# Patient Record
Sex: FEMALE | Race: WHITE | Employment: FULL TIME | URBAN - METROPOLITAN AREA
[De-identification: names, ages, dates, MRNs, and addresses within clinical notes are randomized per-mention and may not be internally consistent; named-entity substitution may affect disease eponyms.]

---

## 2018-07-18 ENCOUNTER — HOSPITAL ENCOUNTER (EMERGENCY)
Age: 38
Discharge: HOME | End: 2018-07-18
Attending: EMERGENCY MEDICINE
Payer: COMMERCIAL

## 2018-07-18 ENCOUNTER — APPOINTMENT (OUTPATIENT)
Dept: GENERAL RADIOLOGY | Age: 38
End: 2018-07-18
Attending: EMERGENCY MEDICINE
Payer: COMMERCIAL

## 2018-07-18 VITALS
BODY MASS INDEX: 28.79 KG/M2 | DIASTOLIC BLOOD PRESSURE: 84 MMHG | HEART RATE: 80 BPM | WEIGHT: 190 LBS | RESPIRATION RATE: 20 BRPM | HEIGHT: 68 IN | OXYGEN SATURATION: 100 % | SYSTOLIC BLOOD PRESSURE: 160 MMHG | TEMPERATURE: 98.5 F

## 2018-07-18 DIAGNOSIS — S00.83XA CONTUSION OF FACE, INITIAL ENCOUNTER: Primary | ICD-10-CM

## 2018-07-18 DIAGNOSIS — S16.1XXA STRAIN OF NECK MUSCLE, INITIAL ENCOUNTER: ICD-10-CM

## 2018-07-18 PROCEDURE — 99284 EMERGENCY DEPT VISIT MOD MDM: CPT | Performed by: EMERGENCY MEDICINE

## 2018-07-18 PROCEDURE — 70150 X-RAY EXAM OF FACIAL BONES: CPT

## 2018-07-18 PROCEDURE — 74011250637 HC RX REV CODE- 250/637: Performed by: EMERGENCY MEDICINE

## 2018-07-18 RX ORDER — IBUPROFEN 800 MG/1
800 TABLET ORAL
Status: COMPLETED | OUTPATIENT
Start: 2018-07-18 | End: 2018-07-18

## 2018-07-18 RX ORDER — CYCLOBENZAPRINE HCL 10 MG
10 TABLET ORAL
Status: COMPLETED | OUTPATIENT
Start: 2018-07-18 | End: 2018-07-18

## 2018-07-18 RX ORDER — ORPHENADRINE CITRATE 100 MG/1
100 TABLET, EXTENDED RELEASE ORAL 2 TIMES DAILY
Qty: 20 TAB | Refills: 0 | Status: SHIPPED | OUTPATIENT
Start: 2018-07-18

## 2018-07-18 RX ORDER — DICLOFENAC SODIUM 75 MG/1
75 TABLET, DELAYED RELEASE ORAL 2 TIMES DAILY
Qty: 20 TAB | Refills: 0 | Status: SHIPPED | OUTPATIENT
Start: 2018-07-18

## 2018-07-18 RX ADMIN — CYCLOBENZAPRINE HYDROCHLORIDE 10 MG: 10 TABLET, FILM COATED ORAL at 22:06

## 2018-07-18 RX ADMIN — IBUPROFEN 800 MG: 800 TABLET, FILM COATED ORAL at 22:06
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
## 2018-07-19 NOTE — ED NOTES
I have reviewed discharge instructions with the patient and spouse. The patient and spouse verbalized understanding. Patient left ED via Discharge Method: ambulatory to Home with spouse. Opportunity for questions and clarification provided. Patient given 2 scripts. To continue your aftercare when you leave the hospital, you may receive an automated call from our care team to check in on how you are doing. This is a free service and part of our promise to provide the best care and service to meet your aftercare needs.  If you have questions, or wish to unsubscribe from this service please call [phone]. Thank you for Choosing our [facility].

## 2018-07-19 NOTE — DISCHARGE INSTRUCTIONS
Neck Strain: Care Instructions  Your Care Instructions    You have strained the muscles and ligaments in your neck. A sudden, awkward movement can strain the neck. This often occurs with falls or car accidents or during certain sports. Everyday activities like working on a computer or sleeping can also cause neck strain if they force you to hold your neck in an awkward position for a long time. It is common for neck pain to get worse for a day or two after an injury, but it should start to feel better after that. You may have more pain and stiffness for several days before it gets better. This is expected. It may take a few weeks or longer for it to heal completely. Good home treatment can help you get better faster and avoid future neck problems. Follow-up care is a key part of your treatment and safety. Be sure to make and go to all appointments, and call your doctor if you are having problems. It's also a good idea to know your test results and keep a list of the medicines you take. How can you care for yourself at home? · If you were given a neck brace (cervical collar) to limit neck motion, wear it as instructed for as many days as your doctor tells you to. Do not wear it longer than you were told to. Wearing a brace for too long can make neck stiffness worse and weaken the neck muscles. · You can try using heat or ice to see if it helps. ¨ Try using a heating pad on a low or medium setting for 15 to 20 minutes every 2 to 3 hours. Try a warm shower in place of one session with the heating pad. You can also buy single-use heat wraps that last up to 8 hours. ¨ You can also try an ice pack for 10 to 15 minutes every 2 to 3 hours. · Take pain medicines exactly as directed. ¨ If the doctor gave you a prescription medicine for pain, take it as prescribed. ¨ If you are not taking a prescription pain medicine, ask your doctor if you can take an over-the-counter medicine.   · Gently rub the area to relieve pain and help with blood flow. Do not massage the area if it hurts to do so. · Do not do anything that makes the pain worse. Take it easy for a couple of days. You can do your usual activities if they do not hurt your neck or put it at risk for more stress or injury. · Try sleeping on a special neck pillow. Place it under your neck, not under your head. Placing a tightly rolled-up towel under your neck while you sleep will also work. If you use a neck pillow or rolled towel, do not use your regular pillow at the same time. · To prevent future neck pain, do exercises to stretch and strengthen your neck and back. Learn how to use good posture, safe lifting techniques, and proper body mechanics. When should you call for help? Call 911 anytime you think you may need emergency care. For example, call if:    · You are unable to move an arm or a leg at all.   [facility] your doctor now or seek immediate medical care if:    · You have new or worse symptoms in your arms, legs, chest, belly, or buttocks. Symptoms may include:  ¨ Numbness or tingling. ¨ Weakness. ¨ Pain.     · You lose bladder or bowel control.    Watch closely for changes in your health, and be sure to contact your doctor if:    · You are not getting better as expected. Where can you learn more? Go to http://rancho-kenn.info/. Enter M253 in the search box to learn more about \"Neck Strain: Care Instructions. \"  Current as of: November 29, 2017  Content Version: 11.7  © 6250-3751 [employer]. Care instructions adapted under license by [employer] (which disclaims liability or warranty for this information). If you have questions about a medical condition or this instruction, always ask your healthcare professional. [address] any warranty or liability for your use of this information.

## 2024-05-28 ENCOUNTER — OFFICE VISIT (OUTPATIENT)
Age: 44
End: 2024-05-28
Payer: COMMERCIAL

## 2024-05-28 VITALS — BODY MASS INDEX: 32.58 KG/M2 | WEIGHT: 215 LBS | HEIGHT: 68 IN

## 2024-05-28 DIAGNOSIS — M47.816 FACET ARTHROPATHY, LUMBAR: ICD-10-CM

## 2024-05-28 DIAGNOSIS — M51.16 LUMBAR DISC HERNIATION WITH RADICULOPATHY: ICD-10-CM

## 2024-05-28 DIAGNOSIS — M47.816 LUMBAR SPONDYLOSIS: Primary | ICD-10-CM

## 2024-05-28 PROCEDURE — 99204 OFFICE O/P NEW MOD 45 MIN: CPT | Performed by: PHYSICIAN ASSISTANT

## 2024-05-28 RX ORDER — DICLOFENAC SODIUM 75 MG/1
75 TABLET, DELAYED RELEASE ORAL 2 TIMES DAILY PRN
Qty: 60 TABLET | Refills: 0 | Status: SHIPPED | OUTPATIENT
Start: 2024-05-28

## 2024-05-28 NOTE — PROGRESS NOTES
Name: Lulu Hoover  YOB: 1980  Gender: female  MRN: [id number]    CC: New Patient (Low back pain )       HPI: This is a 43 y.o. year old female who reports many year history of low back pain.  In 2021 she had radiofrequency ablation lumbar spine but Sakshi spine [place] from L3-L5 bilaterally.  This did help with her lower back pain for short period of time.  The recent years the pain has returned and she had a severe exacerbation this past Sunday.  Pain is across the back it can radiate into the buttock and occasionally she will get some numbness on the left anterior thigh.  Symptoms are worse when she standing and walking.  Recently she was only able to stand and walk 12 minutes before she had to sit down.  In the past she had physical therapy, chiropractic care, massage therapy and nothing has alleviated her symptoms.  She had an MRI scan in 2016 that revealed facet arthropathy anterolisthesis note of a disc bulge at L4-5 with some slight abutment on the left L4 nerve root.  I do not have these images but the report is in her records.      This patient  has not had lumbar surgery in the past.          ROS/Meds/PSH/PMH/FH/SH: I personally reviewed the patient's collected intake data.  Below are the pertinents:    Allergies   Allergen Reactions    Sulfa Antibiotics Other (See Comments) and Rash     Other reaction(s): Other (comments)         Current Outpatient Medications:     diclofenac (VOLTAREN) 75 MG EC tablet, Take 1 tablet by mouth 2 times daily as needed for Pain, Disp: 60 tablet, Rfl: 0    No past surgical history on file.    Patient Active Problem List   Diagnosis    Kidney stone         Tobacco:  reports that she has never smoked. She has never used smokeless tobacco.  Alcohol:   Social History     Substance and Sexual Activity   Alcohol Use Not on file        Physical Exam:   BMI: Body mass index is 32.69 kg/m².    GENERAL:  Adult in no acute distress, moderately obese Patient is

## 2024-06-05 ENCOUNTER — HOSPITAL ENCOUNTER (OUTPATIENT)
Dept: PHYSICAL THERAPY | Age: 44
Setting detail: RECURRING SERIES
Discharge: HOME | End: 2024-06-08
Payer: COMMERCIAL

## 2024-06-05 DIAGNOSIS — M62.81 MUSCLE WEAKNESS (GENERALIZED): ICD-10-CM

## 2024-06-05 DIAGNOSIS — G89.29 CHRONIC BACK PAIN GREATER THAN 3 MONTHS DURATION: Primary | ICD-10-CM

## 2024-06-05 DIAGNOSIS — M54.9 CHRONIC BACK PAIN GREATER THAN 3 MONTHS DURATION: Primary | ICD-10-CM

## 2024-06-05 PROCEDURE — 97162 PT EVAL MOD COMPLEX 30 MIN: CPT

## 2024-06-05 NOTE — THERAPY EVALUATION
Strength, Decreased Functional Mobility, Decreased Frio with Home Exercise Program, Decreased Body Mechanics, Difficulty Sleeping, Decreased Activity Tolerance/Endurance*, Decreased Pacing Skills, Increased Fatigue, and Decreased ADL Status   Therapy Prognosis:   Good     Initial Assessment Complexity:   Moderate Complexity       PLAN   Effective Dates: 6/5/2024 TO Plan of Care/Certification Expiration Date: 08/05/24     Frequency/Duration:      Interventions Planned (Treatment may consist of any combination of the following):    Endurance Training, Functional Mobility Training, Home Exercise Program (HEP), Therapeutic Activites, Therapeutic Exercise/Strengthening, and Patient/Caregiver Education & Training   Goals: (Goals have been discussed and agreed upon with patient.)  GOALS: (Goals have been discussed and agreed upon with patient.)   Long term Goals: 8 weeks  Goal Met   1. Lulu Hoover will be independent with HEP to maintain functional gains made with therapy intervention. 1.  [] Date:   2. Lulu Hoover will be able to stand x 30 min in order to prep meals at home. 2.  [] Date:   3. Lulu Hoover will participate in walking program x 6 minutes at a distance of 1000  ft to be comparable to age related norms. 3.  [] Date:   4. Lulu Hoover will be able to sit for 60 min in order to drive in car and complete work related responsibilities. 4.  [] Date:   5. Lulu Hoover will demonstrate a 10 point improvement on the Oswestry to show improvement in function and participation in ADLs/IADLs 5.  [] Date:   6. Lulu Hoover will be able to pull/push 50 lbs in order to complete household chores without restriction. 6.  [] Date:   7. Lulu Hoover will demonstrate appropriate lifting technique from floor to waist level with 20 lbs and no cues from therapist to complete  7.  [] Date:   8. Lulu Hoover will be able to carry 10 lbs in bilateral UE in order to complete household chores, community

## 2024-06-07 ENCOUNTER — HOSPITAL ENCOUNTER (OUTPATIENT)
Dept: PHYSICAL THERAPY | Age: 44
Setting detail: RECURRING SERIES
Discharge: HOME | End: 2024-06-10
Payer: COMMERCIAL

## 2024-06-07 PROCEDURE — 97110 THERAPEUTIC EXERCISES: CPT

## 2024-06-07 NOTE — PROGRESS NOTES
Lulu Hoover  : 1980  Primary: [employer]raw[employer] Kristine ([employer]mercial)  Secondary:  [facility] @ [place] [address] [place] [address]  Phone: [phone]  Fax: [phone]    Plan of Care/Certification Expiration Date: 24        Plan of Care/Certification Expiration Date:  Plan of Care/Certification Expiration Date: 24    Frequency/Duration:      Time In/Out:   Time In: 0820  Time Out: 0900      PT Visit Info:         Visit Count:  2    OUTPATIENT PHYSICAL THERAPY:   Treatment Note 2024       Episode  (chronic back pain)               Treatment Diagnosis:    Chronic back pain greater than 3 months duration  Muscle weakness (generalized)  Medical/Referring Diagnosis:    Lumbar spondylosis  Facet arthropathy, lumbar  Lumbar disc herniation with radiculopathy      Referring Physician:  Debo Kraft PA-C MD Orders:  PT Eval and Treat   Return MD Appt:  24   Date of Onset:  chronic  Allergies:   Sulfa antibiotics  Restrictions/Precautions:   None      Interventions Planned (Treatment may consist of any combination of the following):     See Assessment Note    Subjective Comments:   I really was blown away with the realization that my pain is affected by many different things in my life, not just the arthritis.   Initial Pain Level::     did not rate /10  Post Session Pain Level:        /10  Medications Last Reviewed:  2024  Updated Objective Findings:  see initial evaluation  Treatment     THERAPEUTIC EXERCISE: (40 minutes):    Exercises per grid below to improve mobility, strength, balance, and coordination.   Progressed resistance and repetitions as indicated.     Date:  2024 Date:   Date:     Activity/Exercise Parameters Parameters Parameters   Edu Pain science basics: alarm system sensitivity, cup analogy. Value of cyclic sighs for decreasing anxiety and pain response       Cyclic sighs Explanation and practice

## 2024-06-11 ENCOUNTER — HOSPITAL ENCOUNTER (OUTPATIENT)
Dept: PHYSICAL THERAPY | Age: 44
Setting detail: RECURRING SERIES
Discharge: HOME | End: 2024-06-14
Payer: COMMERCIAL

## 2024-06-11 PROCEDURE — 97110 THERAPEUTIC EXERCISES: CPT

## 2024-06-11 NOTE — PROGRESS NOTES
Lulu Hoover  : 1980  Primary: [employer] Kristine ([employer])  Secondary:  [facility] @ [place] [address] [place] [address]  Phone: [phone]  Fax: [phone]    Plan of Care/Certification Expiration Date: 24        Plan of Care/Certification Expiration Date:  Plan of Care/Certification Expiration Date: 24    Frequency/Duration:  2 x week    Time In/Out:          PT Visit Info:         Visit Count:  3    OUTPATIENT PHYSICAL THERAPY:   Treatment Note 2024       Episode  (chronic back pain)               Treatment Diagnosis:    Chronic back pain greater than 3 months duration  Muscle weakness (generalized)  Medical/Referring Diagnosis:          Referring Physician:  Debo Kraft PA-C MD Orders:  PT Eval and Treat   Return MD Appt:  24   Date of Onset:  chronic  Allergies:   Sulfa antibiotics  Restrictions/Precautions:   None      Interventions Planned (Treatment may consist of any combination of the following):     See Assessment Note    Subjective Comments:   Walked 10,000 steps yesterday, back did not hurt just feet. Has watched 3 of Kirby Zaragoza's recovery strategies videos on [employer].  Initial Pain Level::     did not rate /10  Post Session Pain Level:        /10  Medications Last Reviewed:  2024  Updated Objective Findings:  20 sit to stands in 30 seconds, lumbar flexion full range of motion.  Treatment     THERAPEUTIC EXERCISE: (40 minutes):    Exercises per grid below to improve mobility, strength, balance, and coordination.   Progressed resistance and repetitions as indicated.     Date:  2024 Date:  24 Date:     Activity/Exercise Parameters Parameters Parameters   Edu Pain science basics: alarm system sensitivity, cup analogy. Value of cyclic sighs for decreasing anxiety and pain response   Motion is lotion, benefits of strength training. Safe pain parameters traffic light system.    Cyclic sighs Explanation and practice

## 2024-06-13 ENCOUNTER — HOSPITAL ENCOUNTER (OUTPATIENT)
Dept: PHYSICAL THERAPY | Age: 44
Setting detail: RECURRING SERIES
Discharge: HOME | End: 2024-06-16
Payer: COMMERCIAL

## 2024-06-13 PROCEDURE — 97110 THERAPEUTIC EXERCISES: CPT

## 2024-06-13 PROCEDURE — 97530 THERAPEUTIC ACTIVITIES: CPT

## 2024-06-13 NOTE — PROGRESS NOTES
Lulu Kiko  : 1980  Primary: Nithya Kristine (Commercial)  Secondary:  [facility] @ [place] [address] [place] [address]  Phone: [phone]  Fax: [phone]    Plan of Care/Certification Expiration Date: 24        Plan of Care/Certification Expiration Date:  Plan of Care/Certification Expiration Date: 24    Frequency/Duration:  2 x week    Time In/Out:   Time In: 0800  Time Out: 0845      PT Visit Info:         Visit Count:  4    OUTPATIENT PHYSICAL THERAPY:   Treatment Note 2024       Episode  (chronic back pain)               Treatment Diagnosis:    Chronic back pain greater than 3 months duration  Muscle weakness (generalized)  Medical/Referring Diagnosis:          Referring Physician:  Debo Kraft PA-C MD Orders:  PT Eval and Treat   Return MD Appt:  24   Date of Onset:  chronic  Allergies:   Sulfa antibiotics  Restrictions/Precautions:   None      Interventions Planned (Treatment may consist of any combination of the following):     See Assessment Note    Subjective Comments:   Shins are a little sore from walking alot  Initial Pain Level::   3 /10  Post Session Pain Level:       3 /10  Medications Last Reviewed:  2024  Updated Objective Findings:  see flowsheet  Treatment     THERAPEUTIC EXERCISE: (25 minutes):    Exercises per grid below to improve mobility, strength, balance, and coordination.   Progressed resistance and repetitions as indicated.     Date:  2024 Date:  24 Date:  24   Activity/Exercise Parameters Parameters Parameters   Edu Pain science basics: alarm system sensitivity, cup analogy. Value of cyclic sighs for decreasing anxiety and pain response   Motion is lotion, benefits of strength training. Safe pain parameters traffic light system. Poking into pain    Cyclic sighs Explanation and practice Review  review   Treadmill   1.7 mph 5 min   HR 85 Intervals top speed 3.3   10 min     L stretch

## 2024-06-18 ENCOUNTER — HOSPITAL ENCOUNTER (OUTPATIENT)
Dept: PHYSICAL THERAPY | Age: 44
Setting detail: RECURRING SERIES
Discharge: HOME | End: 2024-06-21
Payer: COMMERCIAL

## 2024-06-18 ENCOUNTER — OFFICE VISIT (OUTPATIENT)
Age: 44
End: 2024-06-18
Payer: COMMERCIAL

## 2024-06-18 DIAGNOSIS — M65.311 TRIGGER THUMB OF RIGHT HAND: ICD-10-CM

## 2024-06-18 DIAGNOSIS — M65.341 TRIGGER RING FINGER OF RIGHT HAND: Primary | ICD-10-CM

## 2024-06-18 PROCEDURE — 97110 THERAPEUTIC EXERCISES: CPT

## 2024-06-18 PROCEDURE — 99214 OFFICE O/P EST MOD 30 MIN: CPT | Performed by: NURSE PRACTITIONER

## 2024-06-18 PROCEDURE — 97530 THERAPEUTIC ACTIVITIES: CPT

## 2024-06-18 NOTE — PROGRESS NOTES
Orthopaedic Hand Surgery Note    Name: Lulu Hoover  YOB: 1980  Gender: female  MRN: [id number]    CC: New patient referred for right ring and right thumb pain    HPI: Patient is a 43 y.o. female with a chief complaint of right ring finger clicking, locking and pain. The symptoms have been going on for 4 years.  She notes that her finger is locking and extremely painful to unlock.  She notes her thumb has just started.  She denies injury.  She denies diabetes.  She has put off coming to the doctor because she has been very nervous about treatment for this..     ROS/Meds/PSH/PMH/FH/SH: I personally reviewed the patients standard intake form.  Pertinents are discussed in the HPI    Physical Examination:  Musculoskeletal:   Examination on the right demonstrates Normal sensation to light touch in the median distribution, normal sensation in ulnar and radial distribution, positive carpal tunnel compression testing and Phalen testing.  Positive tenderness of the right ring and right thumb A1 pulley with palpable clicking and positive  locking to the ring finger and negative locking to the thumb. The extensor tendons all track well over the MCP joints.    Imaging / Electrodiagnostic Tests:     None    Assessment:     ICD-10-CM    1. Trigger ring finger of right hand  M65.341       2. Trigger thumb of right hand  M65.311           Plan:  We discussed the diagnosis and different treatment options. We discussed observation, splinting, cortisone injections and surgical release of the A1 pulley. We discussed that stenosing tenosynovitis at the level of the A1 pulley AKA trigger finger is a chronic condition regardless of how long the symptoms have been present, this most likely has been progressing for much longer than the symptoms were evident and it will likely persist for a long time without medical treatment. Furthermore, the many patients require surgical trigger finger release at some point despite

## 2024-06-18 NOTE — PROGRESS NOTES
Lulu Hoover  : 1980  Primary: [employer] Kristine ([employer])  Secondary:  [facility] @ [place] [address] [place] [address]  Phone: [phone]  Fax: [phone]    Plan of Care/Certification Expiration Date: 24        Plan of Care/Certification Expiration Date:  Plan of Care/Certification Expiration Date: 24    Frequency/Duration:  2 x week    Time In/Out:   Time In: 0800  Time Out: 0845      PT Visit Info:         Visit Count:  5    OUTPATIENT PHYSICAL THERAPY:   Treatment Note 2024       Episode  (chronic back pain)               Treatment Diagnosis:    Chronic back pain greater than 3 months duration  Muscle weakness (generalized)  Medical/Referring Diagnosis:          Referring Physician:  Debo Kraft PA-C MD Orders:  PT Eval and Treat   Return MD Appt:  24   Date of Onset:  chronic  Allergies:   Sulfa antibiotics  Restrictions/Precautions:   None      Interventions Planned (Treatment may consist of any combination of the following):     See Assessment Note    Subjective Comments:   Shins are  sore from walking alot  Initial Pain Level::   3 10  Post Session Pain Level:       3 /10  Medications Last Reviewed:  2024  Updated Objective Findings:  pain increased left shin with walking for 5 minutes on treadmill  Treatment     THERAPEUTIC EXERCISE: (25 minutes):    Exercises per grid below to improve mobility, strength, balance, and coordination.   Progressed resistance and repetitions as indicated.     Date:  2024 Date:  24 Date:  24 Date  24   Activity/Exercise Parameters Parameters Parameters    Edu Pain science basics: alarm system sensitivity, cup analogy. Value of cyclic sighs for decreasing anxiety and pain response   Motion is lotion, benefits of strength training. Safe pain parameters traffic light system. Poking into pain  Too much too soon importance of rest days and gradual increase in activity   Cyclic

## 2024-06-18 NOTE — H&P (VIEW-ONLY)
some short-term benefits of conservative treatment.  After discussing in detail the patient elects to proceed with surgical intervention.  Risk and benefits were addressed in detail with the patient.  She understands and wishes to proceed.  We discussed her postoperative recovery including her restrictions.  This has been going on for over 4 years and is locking every time she makes a fist.  We will give her figure-of-eight brace to use until surgery.    Patient understands risks and benefits of ULTRASOUND-GUIDED RIGHT RING TRIGGER FINGER RELEASE AND RIGHT TRIGGER THUMB INJECTION including but not limited to nerve injury, vessel injury, infection, failure to achieve desired results and possible need for additional surgery. Patient understands and wishes to proceed with surgery.     On Exam:   The patient is alert and oriented; ;   Lung auscultation is clear bilaterally   Heart has RRR without murmurs     Patient voiced accordance and understanding of the information provided and the formulated plan. All questions were answered to the patient's satisfaction during the encounter.    4 This is a chronic illness with exacerbation, progression, or side effect of treatment  Treatment at this time:  Brace, surgical intervention.    SHIRLEY Pruitt - CNP  Orthopaedic Surgery  06/18/24  1:25 PM

## 2024-06-20 DIAGNOSIS — M65.311 TRIGGER THUMB OF RIGHT HAND: ICD-10-CM

## 2024-06-20 DIAGNOSIS — M65.311 TRIGGER THUMB, RIGHT THUMB: ICD-10-CM

## 2024-06-20 DIAGNOSIS — M65.341 TRIGGER RING FINGER OF RIGHT HAND: Primary | ICD-10-CM

## 2024-06-20 DIAGNOSIS — M65.341 TRIGGER FINGER, RIGHT RING FINGER: ICD-10-CM

## 2024-06-21 ENCOUNTER — HOSPITAL ENCOUNTER (OUTPATIENT)
Dept: PHYSICAL THERAPY | Age: 44
Setting detail: RECURRING SERIES
End: 2024-06-21
Payer: COMMERCIAL

## 2024-06-25 ENCOUNTER — HOSPITAL ENCOUNTER (OUTPATIENT)
Dept: PHYSICAL THERAPY | Age: 44
Setting detail: RECURRING SERIES
Discharge: HOME | End: 2024-06-28
Payer: COMMERCIAL

## 2024-06-25 PROCEDURE — 97110 THERAPEUTIC EXERCISES: CPT

## 2024-06-25 RX ORDER — DICLOFENAC SODIUM 75 MG/1
75 TABLET, DELAYED RELEASE ORAL 2 TIMES DAILY PRN
Qty: 60 TABLET | Refills: 0 | OUTPATIENT
Start: 2024-06-25

## 2024-06-25 NOTE — PROGRESS NOTES
Lulu Kiko  : 1980  Primary: [employer] ([employer])  Secondary:  [facility] @ [place] [address] [address] [place] [address]  Phone: [phone]  Fax: [phone]    Plan of Care/Certification Expiration Date: 24        Plan of Care/Certification Expiration Date:  Plan of Care/Certification Expiration Date: 24    Frequency/Duration:  2 x week    Time In/Out:   Time In: 0715  Time Out: 0800      PT Visit Info:         Visit Count:  6    OUTPATIENT PHYSICAL THERAPY:   Treatment Note 2024       Episode  (chronic back pain)               Treatment Diagnosis:    Chronic back pain greater than 3 months duration  Muscle weakness (generalized)  Medical/Referring Diagnosis:    Lumbar spondylosis  Facet arthropathy, lumbar  Lumbar disc herniation with radiculopathy      Referring Physician:  Debo Kraft PA-C MD Orders:  PT Eval and Treat   Return MD Appt:  24   Date of Onset:  chronic  Allergies:   Sulfa antibiotics  Restrictions/Precautions:   None      Interventions Planned (Treatment may consist of any combination of the following):     See Assessment Note    Subjective Comments:   Has started using her rower machine at home. Watched the instructional videos on [employer] ([employer]), that were suggested and they were very helpful.  Initial Pain Level::  did not rate /10  Post Session Pain Level:       3 /10  Medications Last Reviewed:  2024  Updated Objective Findings: squat to 17\" bench  Treatment     THERAPEUTIC EXERCISE: (40 minutes):    Exercises per grid below to improve mobility, strength, balance, and coordination.   Progressed resistance and repetitions as indicated.     Date:  2024 Date:  24 Date:  24 Date  24 Date  24   Activity/Exercise Parameters Parameters Parameters     Edu Pain science basics: alarm system sensitivity, cup analogy. Value of cyclic sighs for decreasing anxiety and pain response   Motion

## 2024-06-27 ENCOUNTER — HOSPITAL ENCOUNTER (OUTPATIENT)
Dept: PHYSICAL THERAPY | Age: 44
Setting detail: RECURRING SERIES
Discharge: HOME | End: 2024-06-30
Payer: COMMERCIAL

## 2024-06-27 PROCEDURE — 97110 THERAPEUTIC EXERCISES: CPT

## 2024-06-27 PROCEDURE — 97530 THERAPEUTIC ACTIVITIES: CPT

## 2024-06-27 NOTE — PROGRESS NOTES
Lulu Hoover  : 1980  Primary: [employer] ([employer])  Secondary:  [facility] @ [place] [address] [address] [place] [address]  Phone: [phone]  Fax: [phone]    Plan of Care/Certification Expiration Date: 24        Plan of Care/Certification Expiration Date:  Plan of Care/Certification Expiration Date: 24    Frequency/Duration:  2 x week    Time In/Out:   Time In: 0800  Time Out: 0855      PT Visit Info:         Visit Count:  7    OUTPATIENT PHYSICAL THERAPY:   Treatment Note 2024       Episode  (chronic back pain)               Treatment Diagnosis:    Chronic back pain greater than 3 months duration  Muscle weakness (generalized)  Medical/Referring Diagnosis:    Lumbar spondylosis  Facet arthropathy, lumbar  Lumbar disc herniation with radiculopathy      Referring Physician:  Debo Kraft PA-C MD Orders:  PT Eval and Treat   Return MD Appt:  24   Date of Onset:  chronic  Allergies:   Sulfa antibiotics  Restrictions/Precautions:   None      Interventions Planned (Treatment may consist of any combination of the following):     See Assessment Note    Subjective Comments:   [place]ent to zoo yesterday did not have to sit down to rest.  Initial Pain Level::  did not rate /10  Post Session Pain Level:       3 /10  Medications Last Reviewed:  2024  Updated Objective Findings: can bend forward and pick 15 lbs up from the floor without pain complaints.  Treatment     THERAPEUTIC EXERCISE: (40 minutes):    Exercises per grid below to improve mobility, strength, balance, and coordination.   Progressed resistance and repetitions as indicated.     Date:  2024 Date:  24 Date:  24 Date  24 Date  24 Date  24   Activity/Exercise Parameters Parameters Parameters      Edu Pain science basics: alarm system sensitivity, cup analogy. Value of cyclic sighs for decreasing anxiety and pain response   Motion is lotion, benefits of

## 2024-07-01 NOTE — PERIOP NOTE
Patient verified name and .  Order for consent NOT found in EHR at time of PAT visit. Unable to verify case posting against order; surgery verified by patient.     Type 1a surgery, PAT phone assessment complete.  Orders not received.  Labs per surgeon: unknown; no orders received    Labs per anesthesia protocol: none indicated    Patient answered medical/surgical history questions at their best of ability. All prior to admission medications documented in EPIC.    Patient instructed to continue taking all prescription medications up to the day of surgery but to take only the following medications the day of surgery according to anesthesia guidelines with a small sip of water: none      Patient informed that all vitamins and supplements should be held 7 days prior to surgery and NSAIDS 5 days prior to surgery. Prescription meds to hold:diclofenac 5 days    Patient instructed on the following:    > Arrive at OPC Entrance, time of arrival to be called the day before by 1700  > NPO after midnight, unless otherwise indicated, including gum, mints, and ice chips  > Responsible adult must drive patient to the hospital, stay during surgery, and patient will need supervision 24 hours after anesthesia  > Use non moisturizing soap in shower the night before surgery and on the morning of surgery  > All piercings must be removed prior to arrival.    > Leave all valuables (money and jewelry) at home but bring insurance card and ID on DOS.   > You may be required to pay a deductible or co-pay on the day of your procedure. You can pre-pay by calling [phone] if your surgery is at the [facility] or [phone] if your surgery is at the [facility].  > Do not wear make-up, nail polish, lotions, cologne, perfumes, powders, or oil on skin. Artificial nails are not permitted.

## 2024-07-05 NOTE — PERIOP NOTE
Preop department called to notify patient of arrival time for scheduled procedure. Instructions given to   - Arrive at OPC Entrance [address].  - Remain NPO after midnight, unless otherwise indicated, including gum, mints, and ice chips.   - Have a responsible adult to drive patient to the hospital, stay during surgery, and patient will need supervision 24 hours after anesthesia.   - Use antibacterial soap in shower the night before surgery and on the morning of surgery.       Was patient contacted: yes  Voicemail left:   Numbers contacted: [phone]   Arrival time: 0700

## 2024-07-07 ENCOUNTER — ANESTHESIA EVENT (OUTPATIENT)
Dept: SURGERY | Age: 44
End: 2024-07-07
Payer: COMMERCIAL

## 2024-07-07 DIAGNOSIS — M65.341 TRIGGER RING FINGER OF RIGHT HAND: Primary | ICD-10-CM

## 2024-07-07 DIAGNOSIS — M65.311 TRIGGER THUMB OF RIGHT HAND: ICD-10-CM

## 2024-07-08 ENCOUNTER — ANESTHESIA (OUTPATIENT)
Dept: SURGERY | Age: 44
End: 2024-07-08
Payer: COMMERCIAL

## 2024-07-08 ENCOUNTER — HOSPITAL ENCOUNTER (OUTPATIENT)
Age: 44
Setting detail: OUTPATIENT SURGERY
Discharge: HOME | End: 2024-07-08
Attending: ORTHOPAEDIC SURGERY | Admitting: ORTHOPAEDIC SURGERY
Payer: COMMERCIAL

## 2024-07-08 VITALS
TEMPERATURE: 97.1 F | SYSTOLIC BLOOD PRESSURE: 123 MMHG | RESPIRATION RATE: 15 BRPM | HEIGHT: 68 IN | BODY MASS INDEX: 37.13 KG/M2 | HEART RATE: 83 BPM | DIASTOLIC BLOOD PRESSURE: 76 MMHG | OXYGEN SATURATION: 97 % | WEIGHT: 245 LBS

## 2024-07-08 LAB
ALBUMIN SERPL-MCNC: 3.9 G/DL (ref 3.5–5)
ALBUMIN/GLOB SERPL: 1.3 (ref 1–1.9)
ALP SERPL-CCNC: 62 U/L (ref 35–104)
ALT SERPL-CCNC: 27 U/L (ref 12–65)
ANION GAP SERPL CALC-SCNC: 11 MMOL/L (ref 9–18)
AST SERPL-CCNC: 25 U/L (ref 15–37)
BASOPHILS # BLD: 0.1 K/UL (ref 0–0.2)
BASOPHILS NFR BLD: 1 % (ref 0–2)
BILIRUB SERPL-MCNC: 0.3 MG/DL (ref 0–1.2)
BUN SERPL-MCNC: 13 MG/DL (ref 6–23)
CALCIUM SERPL-MCNC: 8.9 MG/DL (ref 8.8–10.2)
CHLORIDE SERPL-SCNC: 106 MMOL/L (ref 98–107)
CO2 SERPL-SCNC: 24 MMOL/L (ref 20–28)
CREAT SERPL-MCNC: 0.79 MG/DL (ref 0.6–1.1)
DIFFERENTIAL METHOD BLD: NORMAL
EOSINOPHIL # BLD: 0.2 K/UL (ref 0–0.8)
EOSINOPHIL NFR BLD: 3 % (ref 0.5–7.8)
ERYTHROCYTE [DISTWIDTH] IN BLOOD BY AUTOMATED COUNT: 13.1 % (ref 11.9–14.6)
ERYTHROCYTE [SEDIMENTATION RATE] IN BLOOD: 5 MM/HR (ref 0–20)
GLOBULIN SER CALC-MCNC: 2.9 G/DL (ref 2.3–3.5)
GLUCOSE SERPL-MCNC: 93 MG/DL (ref 70–99)
HCT VFR BLD AUTO: 39.6 % (ref 35.8–46.3)
HGB BLD-MCNC: 13.2 G/DL (ref 11.7–15.4)
IMM GRANULOCYTES # BLD AUTO: 0 K/UL (ref 0–0.5)
IMM GRANULOCYTES NFR BLD AUTO: 0 % (ref 0–5)
LYMPHOCYTES # BLD: 1.7 K/UL (ref 0.5–4.6)
LYMPHOCYTES NFR BLD: 24 % (ref 13–44)
MCH RBC QN AUTO: 30.1 PG (ref 26.1–32.9)
MCHC RBC AUTO-ENTMCNC: 33.3 G/DL (ref 31.4–35)
MCV RBC AUTO: 90.2 FL (ref 82–102)
MONOCYTES # BLD: 0.6 K/UL (ref 0.1–1.3)
MONOCYTES NFR BLD: 9 % (ref 4–12)
NEUTS SEG # BLD: 4.5 K/UL (ref 1.7–8.2)
NEUTS SEG NFR BLD: 63 % (ref 43–78)
NRBC # BLD: 0 K/UL (ref 0–0.2)
PLATELET # BLD AUTO: 274 K/UL (ref 150–450)
PMV BLD AUTO: 9.9 FL (ref 9.4–12.3)
POTASSIUM SERPL-SCNC: 4.2 MMOL/L (ref 3.5–5.1)
PROT SERPL-MCNC: 6.8 G/DL (ref 6.3–8.2)
RBC # BLD AUTO: 4.39 M/UL (ref 4.05–5.2)
SODIUM SERPL-SCNC: 141 MMOL/L (ref 136–145)
URATE SERPL-MCNC: 7.6 MG/DL (ref 2.5–7.1)
WBC # BLD AUTO: 7.2 K/UL (ref 4.3–11.1)

## 2024-07-08 PROCEDURE — 3600000012 HC SURGERY LEVEL 2 ADDTL 15MIN: Performed by: ORTHOPAEDIC SURGERY

## 2024-07-08 PROCEDURE — 6360000002 HC RX W HCPCS: Performed by: NURSE ANESTHETIST, CERTIFIED REGISTERED

## 2024-07-08 PROCEDURE — 86430 RHEUMATOID FACTOR TEST QUAL: CPT

## 2024-07-08 PROCEDURE — 3700000001 HC ADD 15 MINUTES (ANESTHESIA): Performed by: ORTHOPAEDIC SURGERY

## 2024-07-08 PROCEDURE — 2580000003 HC RX 258: Performed by: ANESTHESIOLOGY

## 2024-07-08 PROCEDURE — 3600000002 HC SURGERY LEVEL 2 BASE: Performed by: ORTHOPAEDIC SURGERY

## 2024-07-08 PROCEDURE — 6360000002 HC RX W HCPCS: Performed by: ORTHOPAEDIC SURGERY

## 2024-07-08 PROCEDURE — 86140 C-REACTIVE PROTEIN: CPT

## 2024-07-08 PROCEDURE — 2500000003 HC RX 250 WO HCPCS: Performed by: ORTHOPAEDIC SURGERY

## 2024-07-08 PROCEDURE — 86200 CCP ANTIBODY: CPT

## 2024-07-08 PROCEDURE — 85025 COMPLETE CBC W/AUTO DIFF WBC: CPT

## 2024-07-08 PROCEDURE — 3700000000 HC ANESTHESIA ATTENDED CARE: Performed by: ORTHOPAEDIC SURGERY

## 2024-07-08 PROCEDURE — 85652 RBC SED RATE AUTOMATED: CPT

## 2024-07-08 PROCEDURE — 2709999900 HC NON-CHARGEABLE SUPPLY: Performed by: ORTHOPAEDIC SURGERY

## 2024-07-08 PROCEDURE — 80053 COMPREHEN METABOLIC PANEL: CPT

## 2024-07-08 PROCEDURE — 6360000002 HC RX W HCPCS: Performed by: NURSE PRACTITIONER

## 2024-07-08 PROCEDURE — 81374 HLA I TYPING 1 ANTIGEN LR: CPT

## 2024-07-08 PROCEDURE — 6370000000 HC RX 637 (ALT 250 FOR IP): Performed by: ANESTHESIOLOGY

## 2024-07-08 PROCEDURE — 7100000001 HC PACU RECOVERY - ADDTL 15 MIN: Performed by: ORTHOPAEDIC SURGERY

## 2024-07-08 PROCEDURE — 7100000010 HC PHASE II RECOVERY - FIRST 15 MIN: Performed by: ORTHOPAEDIC SURGERY

## 2024-07-08 PROCEDURE — 7100000011 HC PHASE II RECOVERY - ADDTL 15 MIN: Performed by: ORTHOPAEDIC SURGERY

## 2024-07-08 PROCEDURE — 84550 ASSAY OF BLOOD/URIC ACID: CPT

## 2024-07-08 PROCEDURE — 86038 ANTINUCLEAR ANTIBODIES: CPT

## 2024-07-08 PROCEDURE — 7100000000 HC PACU RECOVERY - FIRST 15 MIN: Performed by: ORTHOPAEDIC SURGERY

## 2024-07-08 RX ORDER — ONDANSETRON 2 MG/ML
4 INJECTION INTRAMUSCULAR; INTRAVENOUS
Status: DISCONTINUED | OUTPATIENT
Start: 2024-07-08 | End: 2024-07-08 | Stop reason: HOSPADM

## 2024-07-08 RX ORDER — IBUPROFEN 600 MG/1
1 TABLET ORAL PRN
Status: DISCONTINUED | OUTPATIENT
Start: 2024-07-08 | End: 2024-07-08 | Stop reason: HOSPADM

## 2024-07-08 RX ORDER — METHYLPREDNISOLONE ACETATE 40 MG/ML
INJECTION, SUSPENSION INTRA-ARTICULAR; INTRALESIONAL; INTRAMUSCULAR; SOFT TISSUE PRN
Status: DISCONTINUED | OUTPATIENT
Start: 2024-07-08 | End: 2024-07-08 | Stop reason: ALTCHOICE

## 2024-07-08 RX ORDER — DEXAMETHASONE SODIUM PHOSPHATE 4 MG/ML
INJECTION, SOLUTION INTRA-ARTICULAR; INTRALESIONAL; INTRAMUSCULAR; INTRAVENOUS; SOFT TISSUE PRN
Status: DISCONTINUED | OUTPATIENT
Start: 2024-07-08 | End: 2024-07-08 | Stop reason: SDUPTHER

## 2024-07-08 RX ORDER — MELOXICAM 15 MG/1
15 TABLET ORAL DAILY
Qty: 21 TABLET | Refills: 0 | Status: SHIPPED | OUTPATIENT
Start: 2024-07-08 | End: 2024-07-29

## 2024-07-08 RX ORDER — SODIUM CHLORIDE, SODIUM LACTATE, POTASSIUM CHLORIDE, CALCIUM CHLORIDE 600; 310; 30; 20 MG/100ML; MG/100ML; MG/100ML; MG/100ML
INJECTION, SOLUTION INTRAVENOUS CONTINUOUS
Status: DISCONTINUED | OUTPATIENT
Start: 2024-07-08 | End: 2024-07-08 | Stop reason: HOSPADM

## 2024-07-08 RX ORDER — HALOPERIDOL 5 MG/ML
1 INJECTION INTRAMUSCULAR
Status: DISCONTINUED | OUTPATIENT
Start: 2024-07-08 | End: 2024-07-08 | Stop reason: HOSPADM

## 2024-07-08 RX ORDER — BUPIVACAINE HYDROCHLORIDE 2.5 MG/ML
INJECTION, SOLUTION EPIDURAL; INFILTRATION; INTRACAUDAL PRN
Status: DISCONTINUED | OUTPATIENT
Start: 2024-07-08 | End: 2024-07-08 | Stop reason: ALTCHOICE

## 2024-07-08 RX ORDER — SODIUM CHLORIDE 0.9 % (FLUSH) 0.9 %
5-40 SYRINGE (ML) INJECTION PRN
Status: DISCONTINUED | OUTPATIENT
Start: 2024-07-08 | End: 2024-07-08 | Stop reason: HOSPADM

## 2024-07-08 RX ORDER — SODIUM CHLORIDE 0.9 % (FLUSH) 0.9 %
5-40 SYRINGE (ML) INJECTION EVERY 12 HOURS SCHEDULED
Status: DISCONTINUED | OUTPATIENT
Start: 2024-07-08 | End: 2024-07-08 | Stop reason: HOSPADM

## 2024-07-08 RX ORDER — SODIUM CHLORIDE 9 MG/ML
INJECTION, SOLUTION INTRAVENOUS PRN
Status: DISCONTINUED | OUTPATIENT
Start: 2024-07-08 | End: 2024-07-08 | Stop reason: HOSPADM

## 2024-07-08 RX ORDER — NALOXONE HYDROCHLORIDE 0.4 MG/ML
INJECTION, SOLUTION INTRAMUSCULAR; INTRAVENOUS; SUBCUTANEOUS PRN
Status: DISCONTINUED | OUTPATIENT
Start: 2024-07-08 | End: 2024-07-08 | Stop reason: HOSPADM

## 2024-07-08 RX ORDER — LIDOCAINE HYDROCHLORIDE 10 MG/ML
1 INJECTION, SOLUTION INFILTRATION; PERINEURAL
Status: DISCONTINUED | OUTPATIENT
Start: 2024-07-08 | End: 2024-07-08 | Stop reason: HOSPADM

## 2024-07-08 RX ORDER — ACETAMINOPHEN 500 MG
1000 TABLET ORAL ONCE
Status: COMPLETED | OUTPATIENT
Start: 2024-07-08 | End: 2024-07-08

## 2024-07-08 RX ORDER — MIDAZOLAM HYDROCHLORIDE 2 MG/2ML
2 INJECTION, SOLUTION INTRAMUSCULAR; INTRAVENOUS
Status: DISCONTINUED | OUTPATIENT
Start: 2024-07-08 | End: 2024-07-08 | Stop reason: HOSPADM

## 2024-07-08 RX ORDER — TRAMADOL HYDROCHLORIDE 50 MG/1
50 TABLET ORAL EVERY 6 HOURS PRN
Qty: 20 TABLET | Refills: 0 | Status: SHIPPED | OUTPATIENT
Start: 2024-07-08 | End: 2024-07-13

## 2024-07-08 RX ORDER — OXYCODONE HYDROCHLORIDE 5 MG/1
5 TABLET ORAL
Status: DISCONTINUED | OUTPATIENT
Start: 2024-07-08 | End: 2024-07-08 | Stop reason: HOSPADM

## 2024-07-08 RX ORDER — LIDOCAINE HYDROCHLORIDE 10 MG/ML
INJECTION, SOLUTION INFILTRATION; PERINEURAL PRN
Status: DISCONTINUED | OUTPATIENT
Start: 2024-07-08 | End: 2024-07-08 | Stop reason: ALTCHOICE

## 2024-07-08 RX ORDER — DEXTROSE MONOHYDRATE 100 MG/ML
INJECTION, SOLUTION INTRAVENOUS CONTINUOUS PRN
Status: DISCONTINUED | OUTPATIENT
Start: 2024-07-08 | End: 2024-07-08 | Stop reason: HOSPADM

## 2024-07-08 RX ORDER — ONDANSETRON 2 MG/ML
INJECTION INTRAMUSCULAR; INTRAVENOUS PRN
Status: DISCONTINUED | OUTPATIENT
Start: 2024-07-08 | End: 2024-07-08 | Stop reason: SDUPTHER

## 2024-07-08 RX ORDER — PROPOFOL 10 MG/ML
INJECTION, EMULSION INTRAVENOUS PRN
Status: DISCONTINUED | OUTPATIENT
Start: 2024-07-08 | End: 2024-07-08 | Stop reason: SDUPTHER

## 2024-07-08 RX ORDER — MIDAZOLAM HYDROCHLORIDE 1 MG/ML
INJECTION INTRAMUSCULAR; INTRAVENOUS PRN
Status: DISCONTINUED | OUTPATIENT
Start: 2024-07-08 | End: 2024-07-08 | Stop reason: SDUPTHER

## 2024-07-08 RX ORDER — HYDROMORPHONE HYDROCHLORIDE 2 MG/ML
0.25 INJECTION, SOLUTION INTRAMUSCULAR; INTRAVENOUS; SUBCUTANEOUS EVERY 5 MIN PRN
Status: DISCONTINUED | OUTPATIENT
Start: 2024-07-08 | End: 2024-07-08 | Stop reason: HOSPADM

## 2024-07-08 RX ADMIN — ONDANSETRON 4 MG: 2 INJECTION INTRAMUSCULAR; INTRAVENOUS at 08:52

## 2024-07-08 RX ADMIN — PROPOFOL 40 MG: 10 INJECTION, EMULSION INTRAVENOUS at 08:49

## 2024-07-08 RX ADMIN — DEXAMETHASONE SODIUM PHOSPHATE 6 MG: 4 INJECTION, SOLUTION INTRAMUSCULAR; INTRAVENOUS at 08:58

## 2024-07-08 RX ADMIN — PROPOFOL 20 MG: 10 INJECTION, EMULSION INTRAVENOUS at 08:51

## 2024-07-08 RX ADMIN — ACETAMINOPHEN 1000 MG: 500 TABLET, FILM COATED ORAL at 07:41

## 2024-07-08 RX ADMIN — PROPOFOL 20 MG: 10 INJECTION, EMULSION INTRAVENOUS at 08:50

## 2024-07-08 RX ADMIN — DEXAMETHASONE SODIUM PHOSPHATE 4 MG: 4 INJECTION, SOLUTION INTRAMUSCULAR; INTRAVENOUS at 08:52

## 2024-07-08 RX ADMIN — MIDAZOLAM 2 MG: 1 INJECTION INTRAMUSCULAR; INTRAVENOUS at 08:43

## 2024-07-08 RX ADMIN — SODIUM CHLORIDE, POTASSIUM CHLORIDE, SODIUM LACTATE AND CALCIUM CHLORIDE: 600; 310; 30; 20 INJECTION, SOLUTION INTRAVENOUS at 07:42

## 2024-07-08 RX ADMIN — Medication 2 G: at 08:47

## 2024-07-08 ASSESSMENT — PAIN - FUNCTIONAL ASSESSMENT: PAIN_FUNCTIONAL_ASSESSMENT: 0-10

## 2024-07-08 ASSESSMENT — PAIN SCALES - GENERAL
PAINLEVEL_OUTOF10: 0

## 2024-07-08 NOTE — ANESTHESIA POSTPROCEDURE EVALUATION
Department of Anesthesiology  Postprocedure Note    Patient: Lulu Hoover  MRN: [id number]  YOB: 1980  Date of evaluation: 7/8/2024    Procedure Summary       Date: 07/08/24 Room / Location: [facility] OP OR 06 / [facility]    Anesthesia Start: 0839 Anesthesia Stop: 0906    Procedures:       right ring ultra sound guided trigger release (Right: Hand)      INJECTION MEDICATION right thumb trigger (Right: Hand) Diagnosis:       Trigger finger, right ring finger      Trigger thumb, right thumb      (Trigger finger, right ring finger [M65.341])      (Trigger thumb, right thumb [M65.311])    Surgeons: Alexsander Wilson MD Responsible Provider: Ifeoma Peña MD    Anesthesia Type: TIVA ASA Status: 2            Anesthesia Type: TIVA    Wesley Phase I: Wesley Score: 10    Wesley Phase II: Wesley Score: 10    Anesthesia Post Evaluation    Patient location during evaluation: PACU  Patient participation: complete - patient participated  Level of consciousness: awake and alert  Airway patency: patent  Nausea: well controlled.  Cardiovascular status: acceptable.  Respiratory status: acceptable  Hydration status: stable  Pain management: adequate    No notable events documented.

## 2024-07-08 NOTE — ANESTHESIA PRE PROCEDURE
Patient Active Problem List   Diagnosis Code   • Kidney stone N20.0   • Trigger thumb of right hand M65.311   • Trigger ring finger of right hand M65.341   • Trigger finger, right ring finger M65.341   • Trigger thumb, right thumb M65.311       Past Medical History:        Diagnosis Date   • Trigger finger of right hand     ring and thumb       Past Surgical History:        Procedure Laterality Date   • DILATION AND CURETTAGE OF UTERUS     • OTHER SURGICAL HISTORY      lumbar ablation   • TONSILLECTOMY     • WISDOM TOOTH EXTRACTION         Social History:    Social History     Tobacco Use   • Smoking status: Never   • Smokeless tobacco: Never   Substance Use Topics   • Alcohol use: Not Currently                                Counseling given: Not Answered      Vital Signs (Current):   Vitals:    07/01/24 1201 07/08/24 0715 07/08/24 0743   BP:  134/87    Pulse:  90    Resp:  18    Temp:  98 °F (36.7 °C)    TempSrc:  Oral    SpO2:  96%    Weight: 111.1 kg (245 lb) 111.1 kg (245 lb) 111.1 kg (245 lb)   Height: 1.727 m (5' 8\")  1.727 m (5' 8\")                                              BP Readings from Last 3 Encounters:   07/08/24 134/87       NPO Status: Time of last liquid consumption: 2000                        Time of last solid consumption: 2000                        Date of last liquid consumption: 07/07/24                        Date of last solid food consumption: 07/07/24    BMI:   Wt Readings from Last 3 Encounters:   07/08/24 111.1 kg (245 lb)   05/28/24 97.5 kg (215 lb)     Body mass index is 37.25 kg/m².    CBC: No results found for: \"WBC\", \"RBC\", \"HGB\", \"HCT\", \"MCV\", \"RDW\", \"PLT\"    CMP: No results found for: \"NA\", \"K\", \"CL\", \"CO2\", \"BUN\", \"CREATININE\", \"GFRAA\", \"AGRATIO\", \"LABGLOM\", \"GLUCOSE\", \"GLU\", \"CALCIUM\", \"BILITOT\", \"ALKPHOS\", \"AST\", \"ALT\"    POC Tests: No results for input(s): \"POCGLU\", \"POCNA\", \"POCK\", \"POCCL\", \"POCBUN\", \"POCHEMO\", \"POCHCT\" in the last 72 hours.    Coags: No results found

## 2024-07-08 NOTE — OP NOTE
flexion crease. The UltraGuideTFR introducer was then passed through the incision, and the tip used to dissect through the remaining subcutaneous tissue while protecting the digital nerves and vessels using ultrasound guidance. The introducer was passed into the tendon sheath. The proximal and distal edges of the A1 pulley were identified, and the introducer tip was passed deep and distal to A1. The cutting blade was then advanced along the introducer in a similar manner. Following confirmation of acceptable placement, including the position of the flexor tendons, digital nerves and vessels, and A2 pulley, the blade was engaged, and the A1 pulley as well as the proximal third of A2 were incised along its length. Flexor tendon sheath proximal to A1 was then released, taking care not to injure the digital nerves and vessels. The cutting blade was disengaged and withdrawn from the incision, the pulley and sheath were probed with the introducer to confirm a complete release, and the device was removed. The patient made a fist, and the absence of residual triggering was confirmed.    Wound was irrigated and sterile dressing applied without sutures.      All ultrasound images were stored    The right thumb A1 pulley was injected with 1 cc of 40 mg/mL of Depo-Medrol and 1 cc of quarter percent bupivacaine plain     Disposition: To PACU with no complications and follow up per routine.  Patient is instructed to remove dressings in five days and other precautions include avoidance of heavy and repetitive lifting for 2 weeks, when an appointment for follow up and suture removal will take place.     Alexsander Wilson MD  07/08/24  9:04 AM

## 2024-07-08 NOTE — PERIOP NOTE
Pt signed pregnancy test refusal form and states there is no way possible she could be pregnant. Form placed on chart.

## 2024-07-08 NOTE — DISCHARGE INSTRUCTIONS
Postoperative  Instructions:      Weightbearing or Lifting:  You may resume activities as tolerated, limit activities depending on pain level    Showering  Instructions:  You may allow soapy water to run through the incision during showers but do not scrub. After each shower pat dry. Do  not  soak  your  Incision in still water or bathtub  for  1  week  after  surgery.    If  the  incision  gets  wet otherwise,  pat  dry  and  do  not  scrub  the  incision.  Do  not  apply  cream  or  lotion  to  incision      Pain  Control:  - You  have  been  given  a  prescription  to  be  taken  as  directed  for  post-operative  pain  control.    In  addition,  elevate  the  operative  extremity  above  the  heart  at  all  times  to  prevent  swelling  and  throbbing  pain.   - If you develop constipation while taking narcotic pain medications (Norco, Hydrocodone, Percocet, Oxycodone, Dilaudid, Hydromorphone) take  over-the-counter  Colace,  100mg  by  mouth  twice  a  Day.     - Nausea  is  a  common  side  effect  of  many  pain  medications.  You  will  want  to  eat something  before  taking  your  pain  medicine  to  help  prevent  Nausea.  - If  you  are  taking  a  prescription  pain  medication  that  contains  acetaminophen,  we  recommend  that  you  do  not  take  additional  over  the  counter  acetaminophen  (Tylenol®).      Other  pain  relieving  options:   - Using  a  cold  pack  to  ice  the  affected  area  a  few  times  a  day  (15  to  20  minutes  at  a  time)  can  help  to  relieve  pain,  reduce  swelling  and  bruising.      - Elevation  of  the  affected  area  can  also  help  to  reduce  pain  and  swelling.      Please  contact us through my chart or call  [phone]  with any concern and ask to speak with Dr Wilson team.  Concerning problems include:      -  Excessive  redness  of  the  incisions      -  Drainage  for  more  than  2  Days after surgery or any foul smelling drainage  -  Fever  of

## 2024-07-08 NOTE — INTERVAL H&P NOTE
H&P Update:  Lulu Hoover was seen and examined.  History and physical has been reviewed. The patient has been examined. There have been no significant clinical changes since the completion of the originally dated History and Physical.    Alexsander Wilson MD  Orthopaedic Surgery  07/08/24  7:57 AM

## 2024-07-09 ENCOUNTER — HOSPITAL ENCOUNTER (OUTPATIENT)
Dept: PHYSICAL THERAPY | Age: 44
Setting detail: RECURRING SERIES
Discharge: HOME | End: 2024-07-12
Payer: COMMERCIAL

## 2024-07-09 ENCOUNTER — OFFICE VISIT (OUTPATIENT)
Age: 44
End: 2024-07-09
Payer: COMMERCIAL

## 2024-07-09 DIAGNOSIS — M47.816 FACET ARTHROPATHY, LUMBAR: Primary | ICD-10-CM

## 2024-07-09 LAB
ANA SER QL: NEGATIVE
CCP IGA+IGG SERPL IA-ACNC: 7 UNITS (ref 0–19)
CRP SERPL-MCNC: 3 MG/L (ref 0–10)
RHEUMATOID FACT SER QL LA: NEGATIVE

## 2024-07-09 PROCEDURE — 97530 THERAPEUTIC ACTIVITIES: CPT

## 2024-07-09 PROCEDURE — 97110 THERAPEUTIC EXERCISES: CPT

## 2024-07-09 PROCEDURE — 99213 OFFICE O/P EST LOW 20 MIN: CPT | Performed by: PHYSICIAN ASSISTANT

## 2024-07-09 NOTE — PROGRESS NOTES
Name: Lulu Hovoer  YOB: 1980  Gender: female  MRN: [id number]    CC: Back Pain (Follow up after PT)       HPI: This is a 43 y.o. year old female who reports many year history of low back pain.  In 2021 she had radiofrequency ablation lumbar spine but Thompson spine [place] from L3-L5 bilaterally.  This did help with her lower back pain for short period of time.  The recent years the pain has returned and she had a severe exacerbation this past Sunday.  Pain is across the back it can radiate into the buttock and occasionally she will get some numbness on the left anterior thigh.  Symptoms are worse when she standing and walking.  Recently she was only able to stand and walk 12 minutes before she had to sit down.  In the past she had physical therapy, chiropractic care, massage therapy and nothing has alleviated her symptoms.  She had an MRI scan in 2016 that revealed facet arthropathy and disc bulge at L4-5 with some slight abutment on the left L4 nerve root.  I do not have these images but the report is in her records.    We referred her to physical therapy and started a trial of diclofenac.  She reports diclofenac helped tremendously.  She did have to discontinue it before her trigger finger surgery and is now on meloxicam.  Overall she is doing very well and physical therapy and NSAID have helped.  She does not feel she needs any further treatment at this time but we did discuss if symptoms exacerbate or progress, new MRI scan would be indicated.          ROS/Meds/PSH/PMH/FH/SH: I personally reviewed the patient's collected intake data.  Below are the pertinents:    Allergies   Allergen Reactions    Lidocaine     Sulfa Antibiotics Other (See Comments) and Rash     Other reaction(s): Other (comments)         Current Outpatient Medications:     meloxicam (MOBIC) 15 MG tablet, Take 1 tablet by mouth daily for 21 days, Disp: 21 tablet, Rfl: 0    traMADol (ULTRAM) 50 MG tablet, Take 1 tablet by

## 2024-07-09 NOTE — PROGRESS NOTES
Date:  6/13/24 Date  6/18/24 Date  6/25/24 Date  6/27/24 Date  7/9/24   Activity/Exercise Parameters Parameters Parameters       Edu Pain science basics: alarm system sensitivity, cup analogy. Value of cyclic sighs for decreasing anxiety and pain response   Motion is lotion, benefits of strength training. Safe pain parameters traffic light system. Poking into pain  Too much too soon importance of rest days and gradual increase in activity Importance of leg strength for longevity. Relationship of ex to increase in pain tolerance. Benefits of HIT training, taught how to [employer] program Edema management post op, gentle tendon glides to restore AROM  hand   Cyclic sighs Explanation and practice Review  review    Review for acute pain    Treadmill   1.7 mph 5 min   HR 85 Intervals top speed 3.3   10 min   5 min 2.5 mph 5 min   8 min    L stretch   5 x  5 x intermittent  5 x  5 x 5 x     Air bike    4 min   5 min    Standing tib raises    10 x      Tib bar    2 x 10 bar only      Overhead reach with pole  2 x 5 with focus on breathing 2 x 5 with focus on breathing       Cat camel, hip hinge, prone propping with cervical motion  10 x ea 20 x home 20 x Hip hinge dowel  to knees 5 x  Mid shin    Warmup        OH reach to toe touch, SB to OH reach  Windmills, airsquats, 10 x ea     Resisted sidesteps     Band at knees 1.30 min     Squat mobility      Holding onto squat rack 5 x 10 sec holds 3 x  5 x    Sit to stand  30 sec on off  20, 20,21 reps       6 x 5 reps with band  In circuit: 3 rounds  10 STS  20 step jacks   10 pushups from 30\"  Time 4:02     Backwards walking    140'      Supine marching for abs     20 x      Split squats     2 x 5 bilateral          THERAPEUTIC ACTIVITY: ( 15 minutes):    Therapeutic activities per grid below to improve mobility, strength, coordination, and dynamic movement to improve functional lifting, carrying, reaching, catching, and overhead activities.

## 2024-07-11 ENCOUNTER — HOSPITAL ENCOUNTER (OUTPATIENT)
Dept: PHYSICAL THERAPY | Age: 44
Setting detail: RECURRING SERIES
Discharge: HOME | End: 2024-07-14
Payer: COMMERCIAL

## 2024-07-11 PROCEDURE — 97110 THERAPEUTIC EXERCISES: CPT

## 2024-07-11 PROCEDURE — 97530 THERAPEUTIC ACTIVITIES: CPT

## 2024-07-11 NOTE — PROGRESS NOTES
Lulu Hoover  : 1980  Primary: [employer] ([employer])  Secondary:  [facility] @ [place] [address] [address] [place] [address]  Phone: [phone]  Fax: [phone]    Plan of Care/Certification Expiration Date: 24        Plan of Care/Certification Expiration Date:  Plan of Care/Certification Expiration Date: 24    Frequency/Duration:  2 x week    Time In/Out:   Time In: 0715  Time Out: 0800      PT Visit Info:         Visit Count:  Visit count could not be calculated. Make sure you are using a visit which is associated with an episode.    OUTPATIENT PHYSICAL THERAPY:   Treatment Note 2024       Episode  (chronic back pain)               Treatment Diagnosis:    Chronic back pain greater than 3 months duration  Muscle weakness (generalized)  Medical/Referring Diagnosis:          Referring Physician:  Debo Kraft PA-C MD Orders:  PT Eval and Treat   Return MD Appt:  24   Date of Onset:  chronic  Allergies:   Lidocaine and Sulfa antibiotics  Restrictions/Precautions:   None      Interventions Planned (Treatment may consist of any combination of the following):     See Assessment Note    Subjective Comments:   Saw back doctor continue therapy.  Modified Oswestry score today 23/50.  Initial Pain Level::  did not rate 5/10 hand  Post Session Pain Level:       3 /10  Medications Last Reviewed:  2024  Updated Objective Findings:     Treatment     THERAPEUTIC EXERCISE: (15 minutes):    Exercises per grid below to improve mobility, strength, balance, and coordination.   Progressed resistance and repetitions as indicated.     Date:  24 Date  24 Date  24 Date  24 Date  24 Date  24   Activity/Exercise Parameters        Edu Poking into pain  Too much too soon importance of rest days and gradual increase in activity Importance of leg strength for longevity. Relationship of ex to increase in pain tolerance. Benefits of

## 2024-07-12 ENCOUNTER — TELEPHONE (OUTPATIENT)
Dept: ORTHOPEDIC SURGERY | Age: 44
End: 2024-07-12

## 2024-07-12 NOTE — TELEPHONE ENCOUNTER
She saw FAN for a trigger finger. Now her middle finger, which he did not even tough, feels like it has electricity shooting through it. She wants to know if this is normal and is asking for a return call.

## 2024-07-16 ENCOUNTER — HOSPITAL ENCOUNTER (OUTPATIENT)
Dept: PHYSICAL THERAPY | Age: 44
Setting detail: RECURRING SERIES
Discharge: HOME | End: 2024-07-19
Payer: COMMERCIAL

## 2024-07-16 LAB — HLA-B27 QL NAA+PROBE: NEGATIVE

## 2024-07-16 PROCEDURE — 97530 THERAPEUTIC ACTIVITIES: CPT

## 2024-07-16 PROCEDURE — 97110 THERAPEUTIC EXERCISES: CPT

## 2024-07-16 NOTE — PROGRESS NOTES
Benefits of HIT training, taught how to [employer] program Edema management post op, gentle tendon glides to restore AROM  hand     Cyclic sighs review    Review for acute pain      Treadmill  Intervals top speed 3.3   10 min   5 min 2.5 mph 5 min   8 min  8 min  8 min   L stretch  5 x intermittent  5 x  5 x 5 x   5 x 5 x   Air bike  4 min   5 min      Standing tib raises  10 x        Tib bar  2 x 10 bar only        Overhead reach with pole 2 x 5 with focus on breathing         Cat camel, hip hinge, prone propping with cervical motion 20 x home 20 x Hip hinge dowel  to knees 5 x  Mid shin      Warmup      OH reach to toe touch, SB to OH reach  Windmills, airsquats, 10 x ea   OH reach to toe touch, SB to OH reach  Windmills, airsquats, 10 x ea OH reach to toe touch, SB to OH reach  [place], airsquats, 10 x ea   Resisted sidesteps   Band at knees 1.30 min       Squat mobility    Holding onto squat rack 5 x 10 sec holds 3 x  5 x  5 x 5 x   Sit to stand   6 x 5 reps with band  In circuit: 3 rounds  10 STS  20 step jacks   10 pushups from 30\"  Time 4:02       Backwards walking  140'        Supine marching for abs   20 x        Split squats   2 x 5 bilateral            THERAPEUTIC ACTIVITY: ( 30 minutes):    Therapeutic activities per grid below to improve mobility, strength, coordination, and dynamic movement to improve functional lifting, carrying, reaching, catching, and overhead activities.   Date:  6/13/24 Date:  6/18/24 Date:  6/27/24 Date  7/9/24 Date  7/11/24 7/16/24   Activity/Exercise Parameters Parameters Parameters      Standing hip hinges progressive range of motion 2 x 10  2 x 10  Standing to knees 10 x, shin to knees 10 x, floor to stand 5 x      Rack pulls 15 lbs 2 x 5 RPE 0  25 lbs 5 x RPE 0  35 lbs 3 x RPE 3  45 lbs 3 x 5   RPE 5    15 lbs 10 x  35 lbs 8 x  45 lbs 5 x   55 lbs 3 x  60 lbs 3 x 5     15 lbs 10 x   Dead lifts   15 lb bar from 10\" form instruction focus on

## 2024-07-18 ENCOUNTER — HOSPITAL ENCOUNTER (OUTPATIENT)
Dept: PHYSICAL THERAPY | Age: 44
Setting detail: RECURRING SERIES
Discharge: HOME | End: 2024-07-21
Payer: COMMERCIAL

## 2024-07-18 PROCEDURE — 97110 THERAPEUTIC EXERCISES: CPT

## 2024-07-18 PROCEDURE — 97530 THERAPEUTIC ACTIVITIES: CPT

## 2024-07-18 NOTE — PROGRESS NOTES
Lulu Hoover  : 1980  Primary: [employer] ([employer])  Secondary:  [facility] @ [place] [address] [address] [place] [address]  Phone: [phone]  Fax: [phone]    Plan of Care/Certification Expiration Date: 24        Plan of Care/Certification Expiration Date:  Plan of Care/Certification Expiration Date: 24    Frequency/Duration:  2 x week    Time In/Out:   Time In: 0715  Time Out: 0800      PT Visit Info:         Visit Count:  Visit count could not be calculated. Make sure you are using a visit which is associated with an episode.    OUTPATIENT PHYSICAL THERAPY:   Treatment Note 2024       Episode  (chronic back pain)               Treatment Diagnosis:    Chronic back pain greater than 3 months duration  Muscle weakness (generalized)  Medical/Referring Diagnosis:          Referring Physician:  Debo Kraft PA-C MD Orders:  PT Eval and Treat   Return MD Appt:  24   Date of Onset:  chronic  Allergies:   Lidocaine and Sulfa antibiotics  Restrictions/Precautions:   None      Interventions Planned (Treatment may consist of any combination of the following):     See Assessment Note    Subjective Comments:   Hand feels pretty good, having a flare up of her shin splints  Initial Pain Level::  did not rate   Post Session Pain Level:       /10  Medications Last Reviewed:  2024  Updated Objective Findings:     Treatment     THERAPEUTIC EXERCISE: (30 minutes):    Exercises per grid below to improve mobility, strength, balance, and coordination.   Progressed resistance and repetitions as indicated.     Date:  24 Date  24 Date  24 Date  24 Date  24 Date  24    Activity/Exercise Parameters          Edu Poking into pain  Too much too soon importance of rest days and gradual increase in activity Importance of leg strength for longevity. Relationship of ex to increase in pain tolerance. Benefits of HIT

## 2024-07-22 ENCOUNTER — OFFICE VISIT (OUTPATIENT)
Age: 44
End: 2024-07-22

## 2024-07-22 DIAGNOSIS — M65.341 TRIGGER RING FINGER OF RIGHT HAND: Primary | ICD-10-CM

## 2024-07-22 DIAGNOSIS — M65.311 TRIGGER THUMB OF RIGHT HAND: ICD-10-CM

## 2024-07-22 PROCEDURE — 99024 POSTOP FOLLOW-UP VISIT: CPT | Performed by: NURSE PRACTITIONER

## 2024-07-22 NOTE — PROGRESS NOTES
Orthopaedic Hand Surgery Note    Name: Lulu Hoover  YOB: 1980  Gender: female  MRN: [id number]    HPI: Patient is status post right ring ultra sound guided trigger release - Right and INJECTION MEDICATION right thumb trigger - Right on 7/8/2024. Patient reports pain is improved, no finger locking. No fevers or chills.    Physical Examination:  Wound healing well.  There is no erythema or drainage.  Good finger and wrist range of motion. Pain is improved from preoperative.  Patient is able to make a composite fist.    Assessment:     ICD-10-CM    1. Trigger ring finger of right hand  M65.341       2. Trigger thumb of right hand  M65.311           Status post right ring ultra sound guided trigger release - Right and INJECTION MEDICATION right thumb trigger - Right on 7/8/2024    Plan:  Patient can progress activities as tolerated.  She has no restrictions.  We will see her back as needed.  We discussed her lab work.  Her uric acid was elevated at 7.3.  She has a follow-up appointment with her PCP in the upcoming weeks.  She will discuss this with her.    Cari Domingo NP  Orthopaedic Surgery  07/22/24  11:48 AM

## 2024-07-23 ENCOUNTER — HOSPITAL ENCOUNTER (OUTPATIENT)
Dept: PHYSICAL THERAPY | Age: 44
Setting detail: RECURRING SERIES
Discharge: HOME | End: 2024-07-26
Payer: COMMERCIAL

## 2024-07-23 PROCEDURE — 97110 THERAPEUTIC EXERCISES: CPT

## 2024-07-23 PROCEDURE — 97530 THERAPEUTIC ACTIVITIES: CPT

## 2024-07-23 NOTE — PROGRESS NOTES
Lulu Hoover  : 1980  Primary: [employer] ([employer])  Secondary:  [facility] [place] @ [place] [address] [place] [address]  Phone: [phone]  Fax: [phone]    Plan of Care/Certification Expiration Date: 24        Plan of Care/Certification Expiration Date:  Plan of Care/Certification Expiration Date: 24    Frequency/Duration:  2 x week    Time In/Out:   Time In: 0715  Time Out: 0800      PT Visit Info:         Visit Count:  Visit count could not be calculated. Make sure you are using a visit which is associated with an episode.    OUTPATIENT PHYSICAL THERAPY:   Treatment Note 2024       Episode  (chronic back pain)               Treatment Diagnosis:    Chronic back pain greater than 3 months duration  Muscle weakness (generalized)  Medical/Referring Diagnosis:          Referring Physician:  Debo Kraft PA-C MD Orders:  PT Eval and Treat   Return MD Appt:  24   Date of Onset:  chronic  Allergies:   Lidocaine and Sulfa antibiotics  Restrictions/Precautions:   None      Interventions Planned (Treatment may consist of any combination of the following):     See Assessment Note    Subjective Comments:   Back tightness this morning  Initial Pain Level::  did not rate   Post Session Pain Level:       /10  Medications Last Reviewed:  2024  Updated Objective Findings:     Treatment     THERAPEUTIC EXERCISE: (20 minutes):    Exercises per grid below to improve mobility, strength, balance, and coordination.   Progressed resistance and repetitions as indicated.     Date:  24 Date  24 Date  24 Date  24 Date  24 Date  24   Activity/Exercise Parameters           Edu Poking into pain  Too much too soon importance of rest days and gradual increase in activity Importance of leg strength for longevity. Relationship of ex to increase in pain tolerance. Benefits of HIT training, taught how to

## 2024-07-25 ENCOUNTER — HOSPITAL ENCOUNTER (OUTPATIENT)
Dept: PHYSICAL THERAPY | Age: 44
Setting detail: RECURRING SERIES
Discharge: HOME | End: 2024-07-28
Payer: COMMERCIAL

## 2024-07-25 PROCEDURE — 97530 THERAPEUTIC ACTIVITIES: CPT

## 2024-07-25 PROCEDURE — 97110 THERAPEUTIC EXERCISES: CPT

## 2024-07-25 NOTE — PROGRESS NOTES
Lulu Hoover  : 1980  Primary: [employer] ([employer])  Secondary:  [facility] [place] @ [place] [address] [place] [address]  Phone: [phone]  Fax: [phone]    Plan of Care/Certification Expiration Date: 24        Plan of Care/Certification Expiration Date:  Plan of Care/Certification Expiration Date: 24    Frequency/Duration:  2 x week    Time In/Out:   Time In: 0715  Time Out: 0800      PT Visit Info:         Visit Count:  Visit count could not be calculated. Make sure you are using a visit which is associated with an episode.    OUTPATIENT PHYSICAL THERAPY:   Treatment Note 2024       Episode  (chronic back pain)               Treatment Diagnosis:    Chronic back pain greater than 3 months duration  Muscle weakness (generalized)  Medical/Referring Diagnosis:    Lumbar spondylosis  Facet arthropathy, lumbar  Lumbar disc herniation with radiculopathy      Referring Physician:  Debo Kraft PA-C MD Orders:  PT Eval and Treat   Return MD Appt:  24   Date of Onset:  chronic  Allergies:   Lidocaine and Sulfa antibiotics  Restrictions/Precautions:   None      Interventions Planned (Treatment may consist of any combination of the following):     See Assessment Note    Subjective Comments:   Back tightness this morning  Initial Pain Level::  did not rate   Post Session Pain Level:       /10  Medications Last Reviewed:  2024  Updated Objective Findings:     Treatment     THERAPEUTIC EXERCISE: (25 minutes):    Exercises per grid below to improve mobility, strength, balance, and coordination.   Progressed resistance and repetitions as indicated.     Date  24 Date  24   Activity/Exercise         Edu Edema management post op, gentle tendon glides to restore AROM  hand        Cyclic sighs Review for acute pain         Treadmill  8 min  8 min  8 min 8 min  8 min  5 min    L stretch   5 x 5 x 5 x 5

## 2024-07-30 ENCOUNTER — HOSPITAL ENCOUNTER (OUTPATIENT)
Dept: PHYSICAL THERAPY | Age: 44
Setting detail: RECURRING SERIES
Discharge: HOME | End: 2024-08-02
Payer: COMMERCIAL

## 2024-07-30 PROCEDURE — 97530 THERAPEUTIC ACTIVITIES: CPT

## 2024-07-30 PROCEDURE — 97110 THERAPEUTIC EXERCISES: CPT

## 2024-07-30 NOTE — PROGRESS NOTES
Lulu Hoover  : 1980  Primary: [employer] ([employer])  Secondary:  [facility] [place] @ [place] [address] [place] [address]  Phone: [phone]  Fax: [phone]    Plan of Care/Certification Expiration Date: 24        Plan of Care/Certification Expiration Date:  Plan of Care/Certification Expiration Date: 24    Frequency/Duration:  2 x week    Time In/Out:          PT Visit Info:         Visit Count:  Visit count could not be calculated. Make sure you are using a visit which is associated with an episode.    OUTPATIENT PHYSICAL THERAPY:   Treatment Note 2024       Episode  (chronic back pain)               Treatment Diagnosis:    Chronic back pain greater than 3 months duration  Muscle weakness (generalized)  Medical/Referring Diagnosis:          Referring Physician:  Debo Kraft PA-C MD Orders:  PT Eval and Treat   Return MD Appt:  24   Date of Onset:  chronic  Allergies:   Lidocaine and Sulfa antibiotics  Restrictions/Precautions:   None      Interventions Planned (Treatment may consist of any combination of the following):     See Assessment Note    Subjective Comments:   Back tightness this morning  Initial Pain Level::  did not rate   Post Session Pain Level:       /10  Medications Last Reviewed:  2024  Updated Objective Findings:     Treatment     THERAPEUTIC EXERCISE: (15 minutes):    Exercises per grid below to improve mobility, strength, balance, and coordination.   Progressed resistance and repetitions as indicated.     Date  24 Date  24   Activity/Exercise          Edu Edema management post op, gentle tendon glides to restore AROM  hand         Cyclic sighs Review for acute pain          Treadmill  8 min  8 min  8 min 8 min  8 min  5 min  5 min   L stretch   5 x 5 x 5 x 5 x     Air bike          Standing tib raises          Tib bar    5 lb on bar 2 x 20, 1 x 10

## 2024-08-01 ENCOUNTER — HOSPITAL ENCOUNTER (OUTPATIENT)
Dept: PHYSICAL THERAPY | Age: 44
Setting detail: RECURRING SERIES
Discharge: HOME | End: 2024-08-04
Payer: COMMERCIAL

## 2024-08-01 PROCEDURE — 97110 THERAPEUTIC EXERCISES: CPT

## 2024-08-01 PROCEDURE — 97530 THERAPEUTIC ACTIVITIES: CPT

## 2024-08-01 NOTE — PROGRESS NOTES
Lulu Hoover  : 1980  Primary: [employer] ([employer])  Secondary:  [facility] @ [place] [address] [place] [address]  Phone: [phone]  Fax: [phone]    Plan of Care/Certification Expiration Date: 24        Plan of Care/Certification Expiration Date:  Plan of Care/Certification Expiration Date: 24    Frequency/Duration:  2 x week    Time In/Out:          PT Visit Info:         Visit Count:  8    OUTPATIENT PHYSICAL THERAPY:   Treatment Note 2024       Episode  (chronic back pain)               Treatment Diagnosis:    Chronic back pain greater than 3 months duration  Muscle weakness (generalized)  Medical/Referring Diagnosis:    Lumbar spondylosis  Facet arthropathy, lumbar  Lumbar disc herniation with radiculopathy      Referring Physician:  Debo Kratf PA-C MD Orders:  PT Eval and Treat   Return MD Appt:  24   Date of Onset:  chronic  Allergies:   Lidocaine and Sulfa antibiotics  Restrictions/Precautions:   None      Interventions Planned (Treatment may consist of any combination of the following):     See Assessment Note    Subjective Comments:   Back tightness this morning  Initial Pain Level::  did not rate   Post Session Pain Level:       /10  Medications Last Reviewed:  2024  Updated Objective Findings:   Patient is able to lift 90 lbs from the floor using a barbell.  Treatment     THERAPEUTIC EXERCISE: (20 minutes):    Exercises per grid below to improve mobility, strength, balance, and coordination.   Progressed resistance and repetitions as indicated.     24   Activity/Exercise       Edu       Cyclic sighs       Treadmill  8 min  5 min  5 min    L stretch  5 x      Tib bar       Overhead reach with pole  OH reach to back squat position 10 x OH reach to back squat position 10 x    Warmup  Airsquats with pole back squat position Airsquats with band at knees,   Sidesteps with band

## 2024-08-07 ENCOUNTER — HOSPITAL ENCOUNTER (OUTPATIENT)
Dept: PHYSICAL THERAPY | Age: 44
Setting detail: RECURRING SERIES
Discharge: HOME | End: 2024-08-10
Payer: COMMERCIAL

## 2024-08-07 PROCEDURE — 97530 THERAPEUTIC ACTIVITIES: CPT

## 2024-08-07 PROCEDURE — 97110 THERAPEUTIC EXERCISES: CPT

## 2024-08-07 NOTE — PROGRESS NOTES
Lulu Hoover  : 1980  Primary: [employer] ([employer])  Secondary:  [facility] @ [place] [address] [place] [address]  Phone: [phone]  Fax: [phone]    Plan of Care/Certification Expiration Date: 24        Plan of Care/Certification Expiration Date:  Plan of Care/Certification Expiration Date: 24    Frequency/Duration:  2 x week    Time In/Out:   Time In: 715  Time Out: 0810      PT Visit Info:         Visit Count:  9    OUTPATIENT PHYSICAL THERAPY:   Treatment Note 2024       Episode  (chronic back pain)               Treatment Diagnosis:    Chronic back pain greater than 3 months duration  Muscle weakness (generalized)  Medical/Referring Diagnosis:    Spinal stenosis, lumbar region without neurogenic claudication      Referring Physician:  Maggie Vasquez PA-C MD Orders:  PT Eval and Treat   Return MD Appt:  24   Date of Onset:  chronic  Allergies:   Lidocaine and Sulfa antibiotics  Restrictions/Precautions:   None      Interventions Planned (Treatment may consist of any combination of the following):     See Assessment Note    Subjective Comments:   Back tightness this morning, relief with warmup.   Wants information about resources for ex equipment and programming.  Initial Pain Level::  did not rate   Post Session Pain Level:       /10  Medications Last Reviewed:  2024  Updated Objective Findings:   Patient is able to lift 95 lbs from the floor using a barbell.  Treatment     THERAPEUTIC EXERCISE: (24 minutes):    Exercises per grid below to improve mobility, strength, balance, and coordination.   Progressed resistance and repetitions as indicated.     24   Activity/Exercise        Edu     Barbell med training templates   Cyclic sighs        Treadmill  8 min  5 min  5 min     L stretch  5 x       Tib bar        Overhead reach with pole  OH reach to back squat position 10 x OH

## 2024-08-09 ENCOUNTER — HOSPITAL ENCOUNTER (OUTPATIENT)
Dept: PHYSICAL THERAPY | Age: 44
Setting detail: RECURRING SERIES
Discharge: HOME | End: 2024-08-12
Payer: COMMERCIAL

## 2024-08-09 PROCEDURE — 97530 THERAPEUTIC ACTIVITIES: CPT

## 2024-08-09 PROCEDURE — 97110 THERAPEUTIC EXERCISES: CPT

## 2024-08-09 NOTE — PROGRESS NOTES
Lulu Hoover  : 1980  Primary: [employer] ([employer])  Secondary:  [facility] [place] @ [place] [address] [place] [address]  Phone: [phone]  Fax: [phone]    Plan of Care/Certification Expiration Date: 24        Plan of Care/Certification Expiration Date:  Plan of Care/Certification Expiration Date: 24    Frequency/Duration:  2 x week    Time In/Out:   Time In: 733  Time Out: 815      PT Visit Info:         Visit Count:  10    OUTPATIENT PHYSICAL THERAPY:   Treatment Note 2024       Episode  (chronic back pain)               Treatment Diagnosis:    Chronic back pain greater than 3 months duration  Muscle weakness (generalized)  Medical/Referring Diagnosis:          Referring Physician:  Maggie Vasquez PA-C MD Orders:  PT Eval and Treat   Return MD Appt:  24   Date of Onset:  chronic  Allergies:   Lidocaine and Sulfa antibiotics  Restrictions/Precautions:   None      Interventions Planned (Treatment may consist of any combination of the following):     See Assessment Note    Subjective Comments:   SOFIA 8/50  Initial Pain Level::  did not rate   Post Session Pain Level:       /10  Medications Last Reviewed:  2024  Updated Objective Findings:   Patient is able to lift 95 lbs from the floor using a barbell.  Treatment     THERAPEUTIC EXERCISE: (10 minutes):    Exercises per grid below to improve mobility, strength, balance, and coordination.   Progressed resistance and repetitions as indicated.     24   Activity/Exercise         Edu     [facility]    Treadmill  8 min  5 min  5 min      L stretch  5 x        Overhead reach with pole  OH reach to back squat position 10 x OH reach to back squat position 10 x  Airsquats with pole back squat position 10 x after hallway ex Airsquats with pole back squat position 10 x after hallway ex   Warmup  Airsquats with pole back

## 2024-08-09 NOTE — THERAPY RECERTIFICATION
Lulu RISSA Kiko  : 1980  Primary: [employer] ([employer])  Secondary:  [facility] @ [place] [address] [address] [place] [address]  Phone: [phone]  Fax: [phone]    Plan of Care/Certification Expiration Date: 24        Plan of Care/Certification Expiration Date:  Plan of Care/Certification Expiration Date: 24    Frequency/Duration:  2 x week    Time In/Out:   Time In: 0733  Time Out: 0815      PT Visit Info:         Visit Count:  10                OUTPATIENT PHYSICAL THERAPY:             Recertification 2024               Episode (chronic back pain)         Treatment Diagnosis:     No data found  Medical/Referring Diagnosis:    Spinal stenosis, lumbar region without neurogenic claudication      Referring Physician:  Maggie Vasquez PA-C MD Orders:  PT Eval and Treat   Return MD Appt:  tbd  Date of Onset:    chronic  Allergies:  Lidocaine and Sulfa antibiotics  Restrictions/Precautions:    None      Medications Last Reviewed:  2024     SUBJECTIVE   History of Injury/Illness (Reason for Referral): Per Debo Kraft PA-C  \"This is a 43 y.o. year old female who reports many year history of low back pain.  In  she had radiofrequency ablation lumbar spine but [place] spine [place] from L3-L5 bilaterally.  This did help with her lower back pain for short period of time.  The recent years the pain has returned and she had a severe exacerbation this past .  Pain is across the back it can radiate into the buttock and occasionally she will get some numbness on the left anterior thigh.  Symptoms are worse when she standing and walking.  Recently she was only able to stand and walk 12 minutes before she had to sit down.  In the past she had physical therapy, chiropractic care, massage therapy and nothing has alleviated her symptoms.\"   24:  Patient states back pain started gradually twenty years ago. Report that standing for

## 2024-08-12 ENCOUNTER — HOSPITAL ENCOUNTER (OUTPATIENT)
Dept: PHYSICAL THERAPY | Age: 44
Setting detail: RECURRING SERIES
Discharge: HOME | End: 2024-08-15
Payer: COMMERCIAL

## 2024-08-12 PROCEDURE — 97110 THERAPEUTIC EXERCISES: CPT

## 2024-08-12 PROCEDURE — 97530 THERAPEUTIC ACTIVITIES: CPT

## 2024-08-12 NOTE — PROGRESS NOTES
Lulu Hoover  : 1980  Primary: [employer] ([employer]  Secondary:  [facility] @ [place] [address] [address] [place] [address]  Phone: [phone]  Fax: [phone]    Plan of Care/Certification Expiration Date: 24        Plan of Care/Certification Expiration Date:  Plan of Care/Certification Expiration Date: 24    Frequency/Duration:  2 x week    Time In/Out:   Time In: 715  Time Out: 805      PT Visit Info:         Visit Count:  11    OUTPATIENT PHYSICAL THERAPY:   Treatment Note 2024       Episode  (chronic back pain)               Treatment Diagnosis:    Chronic back pain greater than 3 months duration  Muscle weakness (generalized)  Medical/Referring Diagnosis:    Spinal stenosis, lumbar region without neurogenic claudication      Referring Physician:  Maggie Vasquez PA-C MD Orders:  PT Eval and Treat   Return MD Appt:  24   Date of Onset:  chronic  Allergies:   Lidocaine and Sulfa antibiotics  Restrictions/Precautions:   None      Interventions Planned (Treatment may consist of any combination of the following):     See Assessment Note    Subjective Comments:   SOFIA 8  Initial Pain Level::  did not rate   Post Session Pain Level:       /10  Medications Last Reviewed:  2024  Updated Objective Findings:   Patient is able to lift 95 lbs from the floor using a barbell.  Treatment     THERAPEUTIC EXERCISE: (15 minutes):    Exercises per grid below to improve mobility, strength, balance, and coordination.   Progressed resistance and repetitions as indicated.     24   Activity/Exercise       Edu  [facility] med training templates     Treadmill        L stretch        Overhead reach with pole  Airsquats with pole back squat position 10 x after hallway ex Airsquats with pole back squat position 10 x after hallway ex    Warmup  Marching, lunges, lateral step with squat length of barbosa 30' ea  Child pose to

## 2024-08-14 ENCOUNTER — HOSPITAL ENCOUNTER (OUTPATIENT)
Dept: PHYSICAL THERAPY | Age: 44
Setting detail: RECURRING SERIES
Discharge: HOME | End: 2024-08-17
Payer: COMMERCIAL

## 2024-08-14 PROCEDURE — 97110 THERAPEUTIC EXERCISES: CPT

## 2024-08-14 PROCEDURE — 97530 THERAPEUTIC ACTIVITIES: CPT

## 2024-08-14 NOTE — PROGRESS NOTES
lunges, lateral step with squat length of barbosa 30' ea  World's greatest stretch 3 x ea, bear crawl 10' x 2   Marching, lunges, lateral step with squat length of barbosa 30' ea Marching, lunges, lateral step with squat length of barbosa 30' ea World's greatest stretch 3 x ea, inch worm 5 x  Lat engagement banded 5 x   Marching, lunges, lateral step with squat length of barbosa 30' ea World's greatest stretch 3 x ea, inch worm 5 x  Lat engagement banded 5 x   Split squats        Randy curls        planks        Lat engagement with band and hip hinge 10 x 2 red band warm up       Lat pull downs black band 3 x 15 end of Rx 3 x 15 end of Rx      Bar hang feet on box    5 x support on toes                THERAPEUTIC ACTIVITY: (30 minutes):    Therapeutic activities per grid below to improve mobility, strength, coordination, and dynamic movement to improve functional lifting, carrying, reaching, catching, and overhead activities.   8/1/24 8/7/24 8/9/24 8/12/24 8/14/24   Activity/Exercise        Standing hip hinges progressive range of motion        Rack pulls        Dead lifts 45 lbs 10 x  65 lbs 5 x  75 lbs 4 x  85 lbs 3 x  90 lbs 3 x 5  Wrist wrap for right hand  65 lbs 5 x  85 lbs 3 x  90 lbs 3 x  95 lbs 3 x 5       65 lbs 5 x   75 lbs 4 x  95 lbs 3 x   105 lbs 3 x   110 lbs 3 x 5    Circuit training        Overhead carry        Back squat with barbell  45 lbs 5 x  55 lbs 5 x  65 lbs 5x  To 13 \" target   With band @ knees barefoot 45 lbs 5 x  55 lbs 5 x  65 lbs 3 x  70 lbs 3 x 5  45 lbs 5 x  55 lbs 5 x  65 lbs 2 x 5    OH barbell press    Dowel 20 x  15 lbs 10 x  20 lbs 5 x  15 lbs 10 x  20 lbs 5 x  25 lbs 3 x 5    Bench press     15 lbs 10 x  25 lbs 7 x  35 lbs 3 x 5       HEP Log Date        2.     3.    4.     5.        POC    Recertification Expiration Date      Plan of Care/Certification Expiration Date: 09/23/24     Visit Count  12    Number of Allowed Visits              Treatment/Session Summary:      Treatment

## 2024-08-19 ENCOUNTER — HOSPITAL ENCOUNTER (OUTPATIENT)
Dept: PHYSICAL THERAPY | Age: 44
Setting detail: RECURRING SERIES
Discharge: HOME | End: 2024-08-22
Payer: COMMERCIAL

## 2024-08-19 PROCEDURE — 97530 THERAPEUTIC ACTIVITIES: CPT

## 2024-08-19 PROCEDURE — 97110 THERAPEUTIC EXERCISES: CPT

## 2024-08-19 NOTE — PROGRESS NOTES
Lulu Hoover  : 1980  Primary: [employer] ([employer]  Secondary:  [facility] @ [place] [address] [address] [place] [address]  Phone: [phone]  Fax: [phone]    Plan of Care/Certification Expiration Date: 24        Plan of Care/Certification Expiration Date:  Plan of Care/Certification Expiration Date: 24    Frequency/Duration:  2 x week    Time In/Out:   Time In: 715  Time Out: 805      PT Visit Info:         Visit Count:  13    OUTPATIENT PHYSICAL THERAPY:   Treatment Note 2024       Episode  (chronic back pain)               Treatment Diagnosis:    Chronic back pain greater than 3 months duration  Muscle weakness (generalized)  Medical/Referring Diagnosis:    Spinal stenosis, lumbar region without neurogenic claudication      Referring Physician:  Maggie Vasquez PA-C MD Orders:  PT Eval and Treat   Return MD Appt:  24   Date of Onset:  chronic  Allergies:   Lidocaine and Sulfa antibiotics  Restrictions/Precautions:   None      Interventions Planned (Treatment may consist of any combination of the following):     See Assessment Note    Subjective Comments:   Is working on accumulating weights for home gym  Initial Pain Level::  did not rate   Post Session Pain Level:       /10  Medications Last Reviewed:  2024  Updated Objective Findings:   Patient is able to perform 75 lb backsquat today.   Treatment     THERAPEUTIC EXERCISE: (15 minutes):    Exercises per grid below to improve mobility, strength, balance, and coordination.   Progressed resistance and repetitions as indicated.     24   Activity/Exercise         Edu  Carlos med training templates       Treadmill          L stretch          Overhead reach with pole  Airsquats with pole back squat position 10 x after hallway ex Airsquats with pole back squat position 10 x after hallway ex      Warmup  Marching, lunges, lateral

## 2024-08-21 ENCOUNTER — HOSPITAL ENCOUNTER (OUTPATIENT)
Dept: PHYSICAL THERAPY | Age: 44
Setting detail: RECURRING SERIES
Discharge: HOME | End: 2024-08-24
Payer: COMMERCIAL

## 2024-08-21 PROCEDURE — 97530 THERAPEUTIC ACTIVITIES: CPT

## 2024-08-21 PROCEDURE — 97110 THERAPEUTIC EXERCISES: CPT

## 2024-08-21 NOTE — PROGRESS NOTES
Lulu Hoover  : 1980  Primary: [employer] ([employer])  Secondary:  [facility] [place] @ [place] [address] [place] [address]  Phone: [phone]  Fax: [phone]    Plan of Care/Certification Expiration Date: 24        Plan of Care/Certification Expiration Date:  Plan of Care/Certification Expiration Date: 24    Frequency/Duration:  2 x week    Time In/Out:   Time In: 0716  Time Out: 0800      PT Visit Info:         Visit Count:  14    OUTPATIENT PHYSICAL THERAPY:   Treatment Note 2024       Episode  (chronic back pain)               Treatment Diagnosis:    Chronic back pain greater than 3 months duration  Muscle weakness (generalized)  Medical/Referring Diagnosis:    Spinal stenosis, lumbar region without neurogenic claudication      Referring Physician:  Maggie Vasquez PA-C MD Orders:  PT Eval and Treat   Return MD Appt:  24   Date of Onset:  chronic  Allergies:   Lidocaine and Sulfa antibiotics  Restrictions/Precautions:   None      Interventions Planned (Treatment may consist of any combination of the following):     See Assessment Note    Subjective Comments:   Is working on accumulating weights for home gym  Initial Pain Level::  did not rate   Post Session Pain Level:      did not rate /10  Medications Last Reviewed:  2024  Updated Objective Findings:   Patient is able to perform 115 lb dead lift  Treatment     THERAPEUTIC EXERCISE: (14 minutes):    Exercises per grid below to improve mobility, strength, balance, and coordination.   Progressed resistance and repetitions as indicated.     24   Activity/Exercise          Edu  [facility] templates        Treadmill           L stretch           Overhead reach with pole  Airsquats with pole back squat position 10 x after hallway ex Airsquats with pole back squat position 10 x after hallway ex       Warmup

## 2024-08-26 ENCOUNTER — HOSPITAL ENCOUNTER (OUTPATIENT)
Dept: PHYSICAL THERAPY | Age: 44
Setting detail: RECURRING SERIES
Discharge: HOME | End: 2024-08-29
Payer: COMMERCIAL

## 2024-08-26 PROCEDURE — 97110 THERAPEUTIC EXERCISES: CPT

## 2024-08-26 PROCEDURE — 97530 THERAPEUTIC ACTIVITIES: CPT

## 2024-08-26 NOTE — PROGRESS NOTES
squat position 10 x after hallway ex         Warmup  Marching, lunges, lateral step with squat length of [place] 30' ea  Child pose to updog 10x   Marching, lunges, lateral step with squat length of [place] 30' ea  World's greatest stretch 3 x ea, bear crawl 10' x 2   Marching, lunges, lateral step with squat length of [place] 30' ea Marching, lunges, lateral step with squat length of [place] 30' ea World's greatest stretch 3 x ea, inch worm 5 x  Lat engagement banded 5 x   Marching, lunges, lateral step with squat length of [place] 30' ea World's greatest stretch 3 x ea, inch worm 5 x  Lat engagement banded 5 x Marching, lunges, lateral step with squat length of [place] 30' ea 3 x ea, inch worm 5 x  Lat engagement banded 5 x  Wall facing squats 2 x 5   Marching, lunges, lateral step with squat length of [place] 30' ea   Banded good mornings and OH press 2 x 10   Inch worms to knee pushup 5 x Marching, lunges, lateral step with squat length of [place] 30' ea   Wall facing air squats 2 x 5    Split squats            Randy curls            planks            Lat engagement with band and hip hinge 10 x 2 red band warm up           Lat pull downs black band 3 x 15 end of Rx 3 x 15 end of Rx          Bar hang feet on box    5 x support on toes        Face pulls       3 x 10 red heavy band          THERAPEUTIC ACTIVITY: (30 minutes):    Therapeutic activities per grid below to improve mobility, strength, coordination, and dynamic movement to improve functional lifting, carrying, reaching, catching, and overhead activities.   8/1/24 8/7/24 8/9/24 8/12/24 8/14/24 8/19/24 8/21/24 8/26/24   Activity/Exercise           Standing hip hinges progressive range of motion           Rack pulls           Dead lifts 45 lbs 10 x  65 lbs 5 x  75 lbs 4 x  85 lbs 3 x  90 lbs 3 x 5  Wrist wrap for right hand  65 lbs 5 x  85 lbs 3 x  90 lbs 3 x  95 lbs 3 x 5       65 lbs 5 x   75 lbs 4 x  95 lbs 3 x   105 lbs 3 x   110 lbs 3 x 5   45 lbs 5 x  75 lbs 5  x  95 lbs 3 x  105 lbs 3 x  115 lbs 3 x 5        Circuit training           Overhead carry           Back squat with barbell  45 lbs 5 x  55 lbs 5 x  65 lbs 5x  To 13 \" target   With band @ knees barefoot 45 lbs 5 x  55 lbs 5 x  65 lbs 3 x  70 lbs 3 x 5  45 lbs 5 x  55 lbs 5 x  65 lbs 2 x 5  45 lbs 5 x  55 lbs 3 x  65 lbs 3 x  70 lbs 2 x  75 lbs 3 x 5   45 lbs 5 x  65 lbs 4 x   75 lbs 3 x   80 lbs 3 x 5   OH barbell press    Dowel 20 x  15 lbs 10 x  20 lbs 5 x  15 lbs 10 x  20 lbs 5 x  25 lbs 3 x 5  15 lbs 10 x  25 lbs x 5 x  35 lbs 3 x 5      Bench press     15 lbs 10 x  25 lbs 7 x  35 lbs 3 x 5  15 lbs 10 x  25 lbs 6 x  35 lbs 3 x 5 15 lbs 6 x  25 lbs 5 x  35 lbs 5 x  40 lbs 2 x 5       HEP Log Date        2.     3.    4.     5.        POC    Recertification Expiration Date      Plan of Care/Certification Expiration Date: 09/23/24     Visit Count  15    Number of Allowed Visits              Treatment/Session Summary:      Treatment Assessment:    Patient had no pain during session   Communication/Consultation:       Nutritional component of fitness   Equipment provided today:  none   Recommendations/Intent for next  treatment session:  Next visit will focus on preparing patient for a successful transition to an independent program.     >Total Treatment Billable Duration: 40 minutes   Time In: 1300  Time Out: 1345    Muna Bee PT         Charge Capture  Events  [employer] Portal  Appt Desk  Attendance Report     Future Appointments   Date Time Provider Department Center   8/28/2024  7:15 AM Muna Bee, PT [facility]   9/5/2024  7:30 AM Stefany Benites, PT [facility]   9/10/2024  7:15 AM Muna Bee, PT [facility]   9/18/2024  7:15 AM Muna Bee, PT [facility]   9/25/2024  7:15 AM Muna Bee, PT [facility]

## 2024-08-26 NOTE — PROGRESS NOTES
position 10 x after hallway ex        Warmup  Marching, lunges, lateral step with squat length of [place] 30' ea  Child pose to updog 10x   Marching, lunges, lateral step with squat length of [place] 30' ea  World's greatest stretch 3 x ea, bear crawl 10' x 2   Marching, lunges, lateral step with squat length of [place] 30' ea Marching, lunges, lateral step with squat length of [place] 30' ea World's greatest stretch 3 x ea, inch worm 5 x  Lat engagement banded 5 x   Marching, lunges, lateral step with squat length of [place] 30' ea World's greatest stretch 3 x ea, inch worm 5 x  Lat engagement banded 5 x Marching, lunges, lateral step with squat length of [place] 30' ea 3 x ea, inch worm 5 x  Lat engagement banded 5 x  Wall facing squats 2 x 5   Marching, lunges, lateral step with squat length of [place] 30' ea   Banded good mornings and OH press 2 x 10   Inch worms to knee pushup 5 x Marching, lunges, lateral step with squat length of [place] 30' ea   Wall facing air squats 2 x 5   Split squats           Randy curls           planks           Lat engagement with band and hip hinge 10 x 2 red band warm up          Lat pull downs black band 3 x 15 end of Rx 3 x 15 end of Rx         Bar hang feet on box    5 x support on toes       Face pulls       3 x 10 red heavy band         THERAPEUTIC ACTIVITY: (30 minutes):    Therapeutic activities per grid below to improve mobility, strength, coordination, and dynamic movement to improve functional lifting, carrying, reaching, catching, and overhead activities.   8/1/24 8/7/24 8/9/24 8/12/24 8/14/24 8/19/24 8/21/24 8/26/24   Activity/Exercise           Standing hip hinges progressive range of motion           Rack pulls           Dead lifts 45 lbs 10 x  65 lbs 5 x  75 lbs 4 x  85 lbs 3 x  90 lbs 3 x 5  Wrist wrap for right hand  65 lbs 5 x  85 lbs 3 x  90 lbs 3 x  95 lbs 3 x 5       65 lbs 5 x   75 lbs 4 x  95 lbs 3 x   105 lbs 3 x   110 lbs 3 x 5   45 lbs 5 x  75 lbs 5 x  95 lbs 3 x  105  lbs 3 x  115 lbs 3 x 5        Circuit training           Overhead carry           Back squat with barbell  45 lbs 5 x  55 lbs 5 x  65 lbs 5x  To 13 \" target   With band @ knees barefoot 45 lbs 5 x  55 lbs 5 x  65 lbs 3 x  70 lbs 3 x 5  45 lbs 5 x  55 lbs 5 x  65 lbs 2 x 5  45 lbs 5 x  55 lbs 3 x  65 lbs 3 x  70 lbs 2 x  75 lbs 3 x 5   45 lbs 5 x  65 lbs 4 x   75 lbs 3 x   80 lbs 3 x 5   OH barbell press    Dowel 20 x  15 lbs 10 x  20 lbs 5 x  15 lbs 10 x  20 lbs 5 x  25 lbs 3 x 5  15 lbs 10 x  25 lbs x 5 x  35 lbs 3 x 5      Bench press     15 lbs 10 x  25 lbs 7 x  35 lbs 3 x 5  15 lbs 10 x  25 lbs 6 x  35 lbs 3 x 5 15 lbs 6 x  25 lbs 5 x  35 lbs 5 x  40 lbs 2 x 5       HEP Log Date        2.     3.    4.     5.        POC    Recertification Expiration Date      Plan of Care/Certification Expiration Date: 09/23/24     Visit Count  15    Number of Allowed Visits              Treatment/Session Summary:      Treatment Assessment:    Patient had no pain during session   Communication/Consultation:       Nutritional component of fitness   Equipment provided today:  none   Recommendations/Intent for next  treatment session:  Next visit will focus on preparing patient for a successful transition to an independent program.     >Total Treatment Billable Duration: 40 minutes   Time In: 0715  Time Out: 0800    Muna Bee PT         Charge Capture  Events  [employer] Portal  Appt Desk  Attendance Report     Future Appointments   Date Time Provider Department Center   8/28/2024  7:15 AM Muna Bee, PT [facility]   9/5/2024  7:30 AM Stefany Benites, PT [facility]   9/10/2024  7:15 AM Muna Bee, PT [facility]   9/18/2024  7:15 AM Muna Bee, PT [facility]   9/25/2024  7:15 AM Muna Bee, PT [facility]

## 2024-08-28 ENCOUNTER — HOSPITAL ENCOUNTER (OUTPATIENT)
Dept: PHYSICAL THERAPY | Age: 44
Setting detail: RECURRING SERIES
Discharge: HOME | End: 2024-08-31
Payer: COMMERCIAL

## 2024-08-28 PROCEDURE — 97530 THERAPEUTIC ACTIVITIES: CPT

## 2024-08-28 PROCEDURE — 97110 THERAPEUTIC EXERCISES: CPT

## 2024-08-28 NOTE — PROGRESS NOTES
Lulu Hoover  : 1980  Primary: [employer]  Secondary:  [facility] @ [place] [address] [place] [address]  Phone: [phone]  Fax: [phone]    Plan of Care/Certification Expiration Date: 24        Plan of Care/Certification Expiration Date:  Plan of Care/Certification Expiration Date: 24    Frequency/Duration:  2 x week    Time In/Out:   Time In: 0715  Time Out: 0800      PT Visit Info:         Visit Count:  16    OUTPATIENT PHYSICAL THERAPY:   Treatment Note 2024       Episode  (chronic back pain)               Treatment Diagnosis:    Chronic back pain greater than 3 months duration  Muscle weakness (generalized)  Medical/Referring Diagnosis:    Spinal stenosis, lumbar region without neurogenic claudication      Referring Physician:  Maggie Vasquez PA-C MD Orders:  PT Eval and Treat   Return MD Appt:  24   Date of Onset:  chronic  Allergies:   Lidocaine and Sulfa antibiotics  Restrictions/Precautions:   None      Interventions Planned (Treatment may consist of any combination of the following):     See Assessment Note    Subjective Comments:   Going to a convention in [place] next week will be on her feet all day for 4 days. Currently standing about 2-3 hours a day.  Post Session Pain Level:      did not rate /10  Medications Last Reviewed:  2024  Updated Objective Findings:   Patient is able to perform 125 lb dead lift with RPE of 7.  Treatment     THERAPEUTIC EXERCISE: (15 minutes):    Exercises per grid below to improve mobility, strength, balance, and coordination.   Progressed resistance and repetitions as indicated.     24   Activity/Exercise            Edu  [facility] med training templates      Protein requirements for support of muscle Building up positional tolerance, importance of rest breaks during upcoming vacation   Treadmill              L stretch             Overhead reach with pole  [place]irsquats with pole back squat position 10 x after hallway ex Airsquats with pole back squat position 10 x after hallway ex         Warmup  Marching, lunges, lateral step with squat length of [place] 30' ea  Child pose to updog 10x   Marching, lunges, lateral step with squat length of [place] 30' ea  World's greatest stretch 3 x ea, bear crawl 10' x 2   Marching, lunges, lateral step with squat length of [place] 30' ea Marching, lunges, lateral step with squat length of [place] 30' ea World's greatest stretch 3 x ea, inch worm 5 x  Lat engagement banded 5 x   Marching, lunges, lateral step with squat length of [place] 30' ea World's greatest stretch 3 x ea, inch worm 5 x  Lat engagement banded 5 x Marching, lunges, lateral step with squat length of [place] 30' ea 3 x ea, inch worm 5 x  Lat engagement banded 5 x  Wall facing squats 2 x 5   Marching, lunges, lateral step with squat length of [place] 30' ea   Banded good mornings and OH press 2 x 10   Inch worms to knee pushup 5 x Marching, lunges, lateral step with squat length of [place] 30' ea   Wall facing air squats 2 x 5 Lat pull downs from top bar black band 10 x  Lat engagement with RDL red band 10 x  Facepulls 23 lbs 10 x     Split squats            Randy curls            planks            Lat engagement with band and hip hinge 10 x 2 red band warm up           Lat pull downs black band 3 x 15 end of Rx 3 x 15 end of Rx          Bar hang feet on box    5 x support on toes        Face pulls       3 x 10 red heavy band   Cable column 23 lbs        THERAPEUTIC ACTIVITY: (25 minutes):    Therapeutic activities per grid below to improve mobility, strength, coordination, and dynamic movement to improve functional lifting, carrying, reaching, catching, and overhead activities.   8/1/24 8/7/24 8/9/24 8/12/24 8/14/24 8/19/24 8/21/24 8/26/24 8/28/24   Activity/Exercise            Standing hip hinges progressive range of motion

## 2024-09-05 ENCOUNTER — APPOINTMENT (OUTPATIENT)
Dept: PHYSICAL THERAPY | Age: 44
End: 2024-09-05
Payer: COMMERCIAL

## 2024-09-10 ENCOUNTER — HOSPITAL ENCOUNTER (OUTPATIENT)
Dept: PHYSICAL THERAPY | Age: 44
Setting detail: RECURRING SERIES
Discharge: HOME | End: 2024-09-13
Payer: COMMERCIAL

## 2024-09-10 PROCEDURE — 97530 THERAPEUTIC ACTIVITIES: CPT

## 2024-09-10 PROCEDURE — 97110 THERAPEUTIC EXERCISES: CPT

## 2024-09-12 ENCOUNTER — APPOINTMENT (OUTPATIENT)
Dept: PHYSICAL THERAPY | Age: 44
End: 2024-09-12
Payer: COMMERCIAL

## 2024-09-16 ENCOUNTER — APPOINTMENT (OUTPATIENT)
Dept: PHYSICAL THERAPY | Age: 44
End: 2024-09-16
Payer: COMMERCIAL

## 2024-09-18 ENCOUNTER — HOSPITAL ENCOUNTER (OUTPATIENT)
Dept: PHYSICAL THERAPY | Age: 44
Setting detail: RECURRING SERIES
Discharge: HOME | End: 2024-09-21
Payer: COMMERCIAL

## 2024-09-18 PROCEDURE — 97110 THERAPEUTIC EXERCISES: CPT

## 2024-09-24 ENCOUNTER — APPOINTMENT (OUTPATIENT)
Dept: PHYSICAL THERAPY | Age: 44
End: 2024-09-24
Payer: COMMERCIAL

## 2024-09-25 ENCOUNTER — HOSPITAL ENCOUNTER (OUTPATIENT)
Dept: PHYSICAL THERAPY | Age: 44
Setting detail: RECURRING SERIES
Discharge: HOME | End: 2024-09-28
Payer: COMMERCIAL

## 2024-09-25 PROCEDURE — 97110 THERAPEUTIC EXERCISES: CPT

## 2024-09-25 PROCEDURE — 97530 THERAPEUTIC ACTIVITIES: CPT

## 2024-09-26 ENCOUNTER — APPOINTMENT (OUTPATIENT)
Dept: PHYSICAL THERAPY | Age: 44
End: 2024-09-26
Payer: COMMERCIAL

## 2024-10-04 ENCOUNTER — HOSPITAL ENCOUNTER (OUTPATIENT)
Dept: PHYSICAL THERAPY | Age: 44
Setting detail: RECURRING SERIES
Discharge: HOME | End: 2024-10-07
Payer: COMMERCIAL

## 2024-10-04 PROCEDURE — 97110 THERAPEUTIC EXERCISES: CPT

## 2024-10-04 PROCEDURE — 97530 THERAPEUTIC ACTIVITIES: CPT
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
## 2024-10-10 ENCOUNTER — OFFICE VISIT (OUTPATIENT)
Age: 44
End: 2024-10-10
Payer: COMMERCIAL

## 2024-10-10 DIAGNOSIS — M54.31 SCIATICA OF RIGHT SIDE: Primary | ICD-10-CM

## 2024-10-10 PROCEDURE — 99214 OFFICE O/P EST MOD 30 MIN: CPT | Performed by: PHYSICIAN ASSISTANT

## 2024-10-10 RX ORDER — PREDNISONE 10 MG/1
10 TABLET ORAL SEE ADMIN INSTRUCTIONS
Qty: 1 EACH | Refills: 0 | Status: SHIPPED | OUTPATIENT
Start: 2024-10-10

## 2024-10-10 NOTE — PROGRESS NOTES
The patient understands the risks and side effects of oral steroids including immunosuppression, hypertension, mood swings, increased blood sugar, including glaucoma. The steroid taper can be followed by NSAIDs once completed.      4 This is a chronic illness/condition with exacerbation and progression    Orders Placed This Encounter   Medications    predniSONE 10 MG (21) TBPK     Sig: Take 10 mg by mouth See Admin Instructions Directions for a 6-day Dosepak (5 mg or 10 mg): Day 1: 2 tablets before breakfast, 1 after lunch, 1 after dinner, 2 at bedtime.  If started late in the day, take 2 tablets every hour for 3 hours, unless otherwise directed by a prescriber. Day 2: 1 tablet before breakfast, 1 after lunch, 1 after dinner, and 2 at bedtime Day 3: 1 tablet before breakfast, 1 after lunch, 1 after dinner, and 1 at bedtime Day 4: 1 tablet before breakfast, 1 after lunch and 1 after bedtime Day 5: 1 tablet before breakfast and 1 at bedtime Day 6: 1 tablet before breakfast     Dispense:  1 each     Refill:  0        No orders of the defined types were placed in this encounter.         No follow-ups on file.     Debo Kraft PA-C  10/10/24      Elements of this note were created using speech recognition software.  As such, errors of speech recognition may be present.

## 2024-10-14 ENCOUNTER — APPOINTMENT (OUTPATIENT)
Dept: PHYSICAL THERAPY | Age: 44
End: 2024-10-14
Payer: COMMERCIAL

## 2024-10-18 ENCOUNTER — HOSPITAL ENCOUNTER (OUTPATIENT)
Dept: PHYSICAL THERAPY | Age: 44
Setting detail: RECURRING SERIES
Discharge: HOME | End: 2024-10-21
Payer: COMMERCIAL

## 2024-10-18 ENCOUNTER — PATIENT MESSAGE (OUTPATIENT)
Age: 44
End: 2024-10-18

## 2024-10-18 DIAGNOSIS — M47.816 FACET ARTHROPATHY, LUMBAR: ICD-10-CM

## 2024-10-18 DIAGNOSIS — M51.16 LUMBAR DISC HERNIATION WITH RADICULOPATHY: ICD-10-CM

## 2024-10-18 DIAGNOSIS — M47.816 LUMBAR SPONDYLOSIS: ICD-10-CM

## 2024-10-18 DIAGNOSIS — M54.31 SCIATICA OF RIGHT SIDE: Primary | ICD-10-CM

## 2024-10-18 PROCEDURE — 97110 THERAPEUTIC EXERCISES: CPT

## 2024-10-18 NOTE — PROGRESS NOTES
Lulu Hoover  : 1980  Primary: [employer]  Secondary:  [facility] @ [place] [address] [place] [address]  Phone: [phone]  Fax: [phone]    Plan of Care/Certification Expiration Date: 24        Plan of Care/Certification Expiration Date:  Plan of Care/Certification Expiration Date: 24    Frequency/Duration:  2 x week    Time In/Out:   Time In: 0815  Time Out: 0845      PT Visit Info:         Visit Count:  21    OUTPATIENT PHYSICAL THERAPY:   Treatment Note 10/18/2024       Episode  (chronic back pain)               Treatment Diagnosis:    Chronic back pain greater than 3 months duration  Muscle weakness (generalized)  Medical/Referring Diagnosis:    Spinal stenosis, lumbar region without neurogenic claudication      Referring Physician:  Maggie Vasquez PA-C MD Orders:  PT Eval and Treat   Return MD Appt:  24   Date of Onset:  chronic  Allergies:   Lidocaine and Sulfa antibiotics  Restrictions/Precautions:   None      Interventions Planned (Treatment may consist of any combination of the following):     See Assessment Note    Subjective Comments:   Had a set back two weeks ago but with the tools learned in therapy has gradually been working back to her exercise program. Feels that she can continue on her own at this point. Is back to walking and body weight squats. Owestry score 12.  Post Session Pain Level:      did not rate /10  Medications Last Reviewed:  10/18/2024  Updated Objective Findings:   Able to bend forward now without pain.  Treatment     THERAPEUTIC EXERCISE: (30 minutes):    Exercises per grid below to improve mobility, strength, balance, and coordination.   Progressed resistance and repetitions as indicated.     8/19/24 8/21/24 8/26/24 8/28/24 9/10/24 9/18/24 9/25/24 10/4/24 10/18/24   Activity/Exercise            Edu   Protein requirements for support of muscle Building up positional tolerance,

## 2024-10-18 NOTE — THERAPY DISCHARGE
Assessment:  Lulu Hoover had a acute muscle strain a month ago which has set her back as far as her strength training program. She has had to return to walking and stretching as main form of rehab.  She has just recently started doing some squatting but feels confident that she has the tools to independently return to her previous level of function in September. She has been discharged to her home program.      PLAN   Effective Dates: 8/9/24 TO Plan of Care/Certification Expiration Date: 09/23/24     Frequency/Duration:      Interventions Planned (Treatment may consist of any combination of the following):    Endurance Training, Functional Mobility Training, Home Exercise Program (HEP), Therapeutic Activites, Therapeutic Exercise/Strengthening, and Patient/Caregiver Education & Training   Goals: (Goals have been discussed and agreed upon with patient.)  GOALS: (Goals have been discussed and agreed upon with patient.)   Long term Goals: 8 weeks  Goal Met   1. Lulu Hoover will be independent with HEP to maintain functional gains made with therapy intervention. 1.  [x] Date: 10/18/24   2. Lulu Hoover will be able to stand x 30 min in order to prep meals at home. 2.  [x] Date: 8/9/24   3. Lulu Hoover will participate in walking program x 6 minutes at a distance of 1000  ft to be comparable to age related norms. 3.  [x] Date:8/9/24   4. Lulu Hoover will be able to sit for 60 min in order to drive in car and complete work related responsibilities. 4.  [x] Date: 8/9/24   5. Lulu Hoover will demonstrate a 10 point improvement on the Oswestry to show improvement in function and participation in ADLs/IADLs 5.  [x] Date:8/9/24   6. Lulu Hoover will be able to pull/push 50 lbs in order to complete household chores without restriction. 6.  [] Date:   7. Lulu Hoover will demonstrate appropriate lifting technique from floor to waist level with 20 lbs and no cues from therapist to complete

## 2024-10-21 ENCOUNTER — TELEPHONE (OUTPATIENT)
Dept: ORTHOPEDIC SURGERY | Age: 44
End: 2024-10-21

## 2024-10-21 ENCOUNTER — TELEPHONE (OUTPATIENT)
Age: 44
End: 2024-10-21

## 2024-10-21 NOTE — TELEPHONE ENCOUNTER
LUMBAR SPINE APPROVAL       [address], [address], [place] [address]  Fax: [phone]  Phone: [phone]  Case Request Results - Your case has been Approved. However there could be pending notifications  Service Order: [id number]  Case Status: Approved  Site Status: Approved  Authorization Number: [id number]  Auth Effective Date: 10/21/2024  Auth End Date: 04/19/2025  Initiated Date: 10/21/2024  Decision Date: 10/21/2024

## 2024-11-04 ENCOUNTER — OFFICE VISIT (OUTPATIENT)
Age: 44
End: 2024-11-04
Payer: COMMERCIAL

## 2024-11-04 DIAGNOSIS — M47.816 FACET ARTHROPATHY, LUMBAR: Primary | ICD-10-CM

## 2024-11-04 DIAGNOSIS — G57.00 PIRIFORMIS SYNDROME, UNSPECIFIED LATERALITY: ICD-10-CM

## 2024-11-04 PROCEDURE — 99214 OFFICE O/P EST MOD 30 MIN: CPT | Performed by: PHYSICIAN ASSISTANT

## 2024-11-04 NOTE — PROGRESS NOTES
Name: Lulu Hoover  YOB: 1980  Gender: female  MRN: [id number]    CC: Follow-up (MRI results)       HPI: This is a 44 y.o. year old female who I have seen in the past with many year history of low back pain.  In 2021 she had radiofrequency ablation lumbar spine but [facility] spine [place] from L3-L5 bilaterally.  This did help with her lower back pain for short period of time.  In early 2024, she had exacerbation of lower back pain.  In the past she had physical therapy, chiropractic care, massage therapy and nothing has alleviated her symptoms.  She had an MRI scan in 2016 that revealed facet arthropathy and disc bulge at L4-5 with some slight abutment on the left L4 nerve root.  I do not have these images but the report is in her records.    We referred her to physical therapy and started a trial of diclofenac.  She reports diclofenac helped tremendously.  She was doing very well and physical therapy and NSAID have helped.      10/10/24 presents today with a new symptom of a jolting electrical pain in her buttock.  She feels the symptoms began in physical therapy.  She has continued with physical therapy they are working on this but she has such a jolt of pain she can hardly sit she can hardly stand it does go into her right buttock some not all the way down her leg.  It does not feel like pulled muscle.  It does feel like pinched nerve.  No bladder or bowel incontinence.  No particular injury that she recalls but it seemed to begin while she was doing physical therapy.    We ordered MRI scan of the lumbar spine to evaluate for new neurogenic compression causing the radicular symptoms.  She returns today reports that pain has improved she has not having the severe radiating pain anymore but she does have burning in the tailbone.       ROS/Meds/PSH/PMH/FH/SH: I personally reviewed the patient's collected intake data.  Below are the pertinents:    Allergies   Allergen Reactions    Lidocaine

## (undated) DEVICE — LIQUIBAND RAPID ADHESIVE 36/CS 0.8ML: Brand: MEDLINE

## (undated) DEVICE — 48" PROBE COVER W/GEL, ULTRASOUND, STERILE: Brand: SITE-RITE

## (undated) DEVICE — INTENDED FOR TISSUE SEPARATION, AND OTHER PROCEDURES THAT REQUIRE A SHARP SURGICAL BLADE TO PUNCTURE OR CUT.: Brand: BARD-PARKER ®  SAFETY SCALPED

## (undated) DEVICE — PADDING CAST W3INXL4YD COT BLEND MIC PLEAT UNDERCAST SPEC

## (undated) DEVICE — GEL US 20GM NONIRRITATING OVERWRAPPED FILE PCH TRNSMIT

## (undated) DEVICE — GLOVE ORANGE PI 7 1/2   MSG9075

## (undated) DEVICE — HAND PACK: Brand: MEDLINE INDUSTRIES, INC.